# Patient Record
Sex: FEMALE | Race: WHITE
[De-identification: names, ages, dates, MRNs, and addresses within clinical notes are randomized per-mention and may not be internally consistent; named-entity substitution may affect disease eponyms.]

---

## 2022-12-12 ENCOUNTER — HOSPITAL ENCOUNTER (OUTPATIENT)
Dept: HOSPITAL 75 - CARD | Age: 82
End: 2022-12-12
Attending: INTERNAL MEDICINE
Payer: MEDICARE

## 2022-12-12 DIAGNOSIS — I34.81: Primary | ICD-10-CM

## 2022-12-12 PROCEDURE — 93306 TTE W/DOPPLER COMPLETE: CPT

## 2023-02-22 ENCOUNTER — HOSPITAL ENCOUNTER (INPATIENT)
Dept: HOSPITAL 75 - ER | Age: 83
LOS: 3 days | Discharge: NURSING, REHAB OR LONG-TERM CARE | DRG: 872 | End: 2023-02-25
Attending: INTERNAL MEDICINE | Admitting: INTERNAL MEDICINE
Payer: MEDICARE

## 2023-02-22 VITALS — BODY MASS INDEX: 35.62 KG/M2 | HEIGHT: 62.99 IN | WEIGHT: 201.06 LBS

## 2023-02-22 VITALS — DIASTOLIC BLOOD PRESSURE: 58 MMHG | SYSTOLIC BLOOD PRESSURE: 112 MMHG

## 2023-02-22 VITALS — DIASTOLIC BLOOD PRESSURE: 61 MMHG | SYSTOLIC BLOOD PRESSURE: 142 MMHG

## 2023-02-22 VITALS — SYSTOLIC BLOOD PRESSURE: 142 MMHG | DIASTOLIC BLOOD PRESSURE: 61 MMHG

## 2023-02-22 DIAGNOSIS — K21.9: ICD-10-CM

## 2023-02-22 DIAGNOSIS — N39.0: ICD-10-CM

## 2023-02-22 DIAGNOSIS — E78.00: ICD-10-CM

## 2023-02-22 DIAGNOSIS — Z20.822: ICD-10-CM

## 2023-02-22 DIAGNOSIS — I10: ICD-10-CM

## 2023-02-22 DIAGNOSIS — G47.33: ICD-10-CM
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
## 2023-02-22 DIAGNOSIS — A41.9: Primary | ICD-10-CM

## 2023-02-22 DIAGNOSIS — F32.A: ICD-10-CM

## 2023-02-22 DIAGNOSIS — E66.9: ICD-10-CM

## 2023-02-22 DIAGNOSIS — F03.90: ICD-10-CM

## 2023-02-22 LAB
ALBUMIN SERPL-MCNC: 3.3 GM/DL (ref 3.2–4.5)
ALP SERPL-CCNC: 46 U/L (ref 40–136)
ALT SERPL-CCNC: 11 U/L (ref 0–55)
APTT BLD: 27 SEC (ref 24–35)
APTT PPP: YELLOW S
ARTERIAL PATENCY WRIST A: (no result)
BACTERIA #/AREA URNS HPF: (no result) /HPF
BASE EXCESS STD BLDA CALC-SCNC: 1.9 MMOL/L (ref -2.5–2.5)
BASOPHILS # BLD AUTO: 0 10^3/UL (ref 0–0.1)
BASOPHILS NFR BLD AUTO: 0 % (ref 0–10)
BASOPHILS NFR BLD MANUAL: 0 %
BDY SITE: (no result)
BILIRUB SERPL-MCNC: 0.4 MG/DL (ref 0.1–1)
BILIRUB UR QL STRIP: NEGATIVE
BODY TEMPERATURE: 36.9
BUN/CREAT SERPL: 21
CALCIUM SERPL-MCNC: 8.4 MG/DL (ref 8.5–10.1)
CHLORIDE SERPL-SCNC: 106 MMOL/L (ref 98–107)
CO2 BLDA CALC-SCNC: 27.3 MMOL/L (ref 21–31)
CO2 SERPL-SCNC: 22 MMOL/L (ref 21–32)
CREAT SERPL-MCNC: 0.7 MG/DL (ref 0.6–1.3)
EOSINOPHIL # BLD AUTO: 0 10^3/UL (ref 0–0.3)
EOSINOPHIL NFR BLD AUTO: 0 % (ref 0–10)
EOSINOPHIL NFR BLD MANUAL: 0 %
FIBRINOGEN PPP-MCNC: CLEAR MG/DL
GFR SERPLBLD BASED ON 1.73 SQ M-ARVRAT: 87 ML/MIN
GLUCOSE SERPL-MCNC: 108 MG/DL (ref 70–105)
GLUCOSE UR STRIP-MCNC: NEGATIVE MG/DL
HCT VFR BLD CALC: 41 % (ref 35–52)
HGB BLD-MCNC: 13.8 G/DL (ref 11.5–16)
INHALED O2 FLOW RATE: (no result) L/MIN
INR PPP: 1.1 (ref 0.8–1.4)
KETONES UR QL STRIP: NEGATIVE
LEUKOCYTE ESTERASE UR QL STRIP: NEGATIVE
LYMPHOCYTES # BLD AUTO: 0.7 10^3/UL (ref 1–4)
LYMPHOCYTES NFR BLD AUTO: 5 % (ref 12–44)
MAGNESIUM SERPL-MCNC: 1.5 MG/DL (ref 1.6–2.4)
MANUAL DIFFERENTIAL PERFORMED BLD QL: YES
MCH RBC QN AUTO: 33 PG (ref 25–34)
MCHC RBC AUTO-ENTMCNC: 34 G/DL (ref 32–36)
MCV RBC AUTO: 97 FL (ref 80–99)
MONOCYTES # BLD AUTO: 1.4 10^3/UL (ref 0–1)
MONOCYTES NFR BLD AUTO: 9 % (ref 0–12)
MONOCYTES NFR BLD: 12 %
NEUTROPHILS # BLD AUTO: 12.5 10^3/UL (ref 1.8–7.8)
NEUTROPHILS NFR BLD AUTO: 85 % (ref 42–75)
NEUTS BAND NFR BLD MANUAL: 75 %
NEUTS BAND NFR BLD: 8 %
NITRITE UR QL STRIP: POSITIVE
PCO2 BLDA: 41 MMHG (ref 35–45)
PH BLDA: 7.42 [PH] (ref 7.37–7.43)
PH UR STRIP: 6.5 [PH] (ref 5–9)
PLATELET # BLD: 140 10^3/UL (ref 130–400)
PMV BLD AUTO: 9.8 FL (ref 9–12.2)
PO2 BLDA: 60 MMHG (ref 79–93)
POTASSIUM SERPL-SCNC: 3.4 MMOL/L (ref 3.6–5)
PROT SERPL-MCNC: 7 GM/DL (ref 6.4–8.2)
PROT UR QL STRIP: (no result)
PROTHROMBIN TIME: 14.7 SEC (ref 12.2–14.7)
RBC #/AREA URNS HPF: (no result) /HPF
RBC MORPH BLD: NORMAL
SAO2 % BLDA FROM PO2: 92 % (ref 94–100)
SODIUM SERPL-SCNC: 140 MMOL/L (ref 135–145)
SP GR UR STRIP: 1.02 (ref 1.02–1.02)
SQUAMOUS #/AREA URNS HPF: (no result) /HPF
T4 FREE SERPL-MCNC: 0.84 NG/DL (ref 0.7–1.48)
VALPROATE SERPL-MCNC: 77.1 UG/ML (ref 50–100)
VARIANT LYMPHS NFR BLD MANUAL: 5 %
VENTILATION MODE VENT: NO
WBC # BLD AUTO: 16.3 10^3/UL (ref 4.3–11)
WBC #/AREA URNS HPF: (no result) /HPF

## 2023-02-22 PROCEDURE — 51701 INSERT BLADDER CATHETER: CPT

## 2023-02-22 PROCEDURE — 74177 CT ABD & PELVIS W/CONTRAST: CPT

## 2023-02-22 PROCEDURE — 85007 BL SMEAR W/DIFF WBC COUNT: CPT

## 2023-02-22 PROCEDURE — 93041 RHYTHM ECG TRACING: CPT

## 2023-02-22 PROCEDURE — 84443 ASSAY THYROID STIM HORMONE: CPT

## 2023-02-22 PROCEDURE — 72125 CT NECK SPINE W/O DYE: CPT

## 2023-02-22 PROCEDURE — 80164 ASSAY DIPROPYLACETIC ACD TOT: CPT

## 2023-02-22 PROCEDURE — 87186 SC STD MICRODIL/AGAR DIL: CPT

## 2023-02-22 PROCEDURE — 86141 C-REACTIVE PROTEIN HS: CPT

## 2023-02-22 PROCEDURE — 85025 COMPLETE CBC W/AUTO DIFF WBC: CPT

## 2023-02-22 PROCEDURE — 36415 COLL VENOUS BLD VENIPUNCTURE: CPT

## 2023-02-22 PROCEDURE — 85730 THROMBOPLASTIN TIME PARTIAL: CPT

## 2023-02-22 PROCEDURE — 93005 ELECTROCARDIOGRAM TRACING: CPT

## 2023-02-22 PROCEDURE — 87077 CULTURE AEROBIC IDENTIFY: CPT

## 2023-02-22 PROCEDURE — 85610 PROTHROMBIN TIME: CPT

## 2023-02-22 PROCEDURE — 87040 BLOOD CULTURE FOR BACTERIA: CPT

## 2023-02-22 PROCEDURE — 87636 SARSCOV2 & INF A&B AMP PRB: CPT

## 2023-02-22 PROCEDURE — 83735 ASSAY OF MAGNESIUM: CPT

## 2023-02-22 PROCEDURE — 71260 CT THORAX DX C+: CPT

## 2023-02-22 PROCEDURE — 93306 TTE W/DOPPLER COMPLETE: CPT

## 2023-02-22 PROCEDURE — 83605 ASSAY OF LACTIC ACID: CPT

## 2023-02-22 PROCEDURE — 85027 COMPLETE CBC AUTOMATED: CPT

## 2023-02-22 PROCEDURE — 83880 ASSAY OF NATRIURETIC PEPTIDE: CPT

## 2023-02-22 PROCEDURE — 72170 X-RAY EXAM OF PELVIS: CPT

## 2023-02-22 PROCEDURE — 82805 BLOOD GASES W/O2 SATURATION: CPT

## 2023-02-22 PROCEDURE — 81000 URINALYSIS NONAUTO W/SCOPE: CPT

## 2023-02-22 PROCEDURE — 80053 COMPREHEN METABOLIC PANEL: CPT

## 2023-02-22 PROCEDURE — 84439 ASSAY OF FREE THYROXINE: CPT

## 2023-02-22 PROCEDURE — 71045 X-RAY EXAM CHEST 1 VIEW: CPT

## 2023-02-22 PROCEDURE — 70450 CT HEAD/BRAIN W/O DYE: CPT

## 2023-02-22 PROCEDURE — 87088 URINE BACTERIA CULTURE: CPT

## 2023-02-22 RX ADMIN — SODIUM CHLORIDE SCH MLS/HR: 900 INJECTION, SOLUTION INTRAVENOUS at 17:10

## 2023-02-22 RX ADMIN — ENOXAPARIN SODIUM SCH MG: 100 INJECTION SUBCUTANEOUS at 17:08

## 2023-02-22 RX ADMIN — DOCUSATE SODIUM SCH MG: 100 CAPSULE ORAL at 20:42

## 2023-02-22 RX ADMIN — SENNOSIDES SCH MG: 8.6 TABLET, FILM COATED ORAL at 20:42

## 2023-02-22 RX ADMIN — ACETAMINOPHEN PRN MG: 325 TABLET ORAL at 17:09

## 2023-02-22 NOTE — DIAGNOSTIC IMAGING REPORT
PROCEDURE: CT chest, abdomen, and pelvis with contrast.



TECHNIQUE: Multiple contiguous axial images were obtained through

the chest, abdomen, and pelvis after the administration of

intravenous contrast. Auto Exposure Controls were utilized during

the CT exam to meet ALARA standards for radiation dose reduction.





INDICATION:  Trauma. Fall. Chest pain. Possible pulmonary

contusion. 



COMPARISON: Chest and pelvis radiographs 02/22/2023.



FINDINGS: 



CT CHEST: Examination limited by respiratory motion. Scattered

atelectasis. Lungs are otherwise clear. No dense consolidation or

findings convincing of a pulmonary contusion. No pleural effusion

or pneumothorax. Cardiomegaly. Tiny pericardial effusion.

Advanced atherosclerotic calcifications is in the coronary

arteries. Isolated 1.2 cm short axis dimension pretracheal lymph

node. No acute osseous findings.



CT ABDOMEN AND PELVIS: Moderate esophageal hiatal hernia. Simple

cyst in the right hepatic lobe. Cholelithiasis without secondary

findings of cholecystitis. The gallbladder, pancreas, spleen,

adrenals, collecting systems and bladder negative. Calcified

uterine fibroids. No evidence of appendicitis. Simple appearing

cysts in the kidneys. No free intraperitoneal air or fluid. No

lymphadenopathy. No evidence of bowel obstruction. Advanced

diverticulosis without evidence of active diverticulitis. No

acute osseous findings. Postoperative changes in the anterior

abdominal wall consistent with hernia repair.



IMPRESSION: 

1. Scattered atelectasis in both lungs. No other focal pulmonary

consolidation or findings suspicious for pulmonary contusion.

2. Cardiomegaly. Tiny pericardial effusion.

3. Single mildly enlarged mediastinal lymph node measuring up 1.2

cm in short axis dimension may be reactive but is indeterminate.

4. Cholelithiasis without secondary findings cholecystitis.

5. Advanced colonic diverticulosis without evidence of active

diverticulitis.

6. Moderate esophageal hiatal hernia.



Dictated by: 



  Dictated on workstation # [id number]

## 2023-02-22 NOTE — XMS REPORT
Clinical Summary

                             Created on: 2023



Yolanda Pollock

External Reference #: [id number]

: 1940

Sex: Female



Demographics





                          Address                   [address]

[place]  [address]

 

                          Home Phone                [phone]

 

                          Preferred Language        Unknown

 

                          Marital Status            

 

                          Jehovah's witness Affiliation     Unknown

 

                          Race                      White

 

                          Ethnic Group              Not  or 





Author





                          Author                    [facility]

 

                          Organization              [facility]

 

                          Address                   Unknown

 

                          Phone                     Unavailable







Care Team Providers





                    Care Team Member Name Role                Phone

 

                          PCP                       Unavailable







Source Comments

[facility] (Labor and Delivery) documents do not appear in the [facility]



Allergies

Not on File



Medications

 

Please verify current medications with patient. 



Not on file



Active Problems





Not on file



Social History





                                        Date



                 Tobacco Use     Types           Packs/Day       Years Used 

 

                                         



                                         Smoking Tobacco: Never    



                                         Assessed    







 



                           Sex Assigned at Birth     Date Recorded

 

 



                                         Not on file 







Last Filed Vital Signs

Not on file



Plan of Treatment





   



                 Health Maintenance  Due Date        Last Done       Comments

 

   



                           DXA Scan                  1940  

 

   



                           COVID-19 Vaccine (#1)     1940  

 

   



                           Pneumococcal Vaccine: 65+  2006  



                                         Years (1 - PCV)   

 

   



                           Influenza Vaccine (#1)    10/01/2022  

 

   



                     HPV Vaccine         Aged Out            No longer eligible 

based on patient's age to



                                         complete this topic

 

   



                     Hepatitis A Vaccine  Aged Out            No longer eligible

 based on patient's age to



                                         complete this topic

 

   



                     Hepatitis B Vaccine  Aged Out            No longer eligible

 based on patient's age to



                                         complete this topic

 

   



                     Hib Vaccine         Aged Out            No longer eligible 

based on patient's age to



                                         complete this topic

 

   



                     IPV Vaccine         Aged Out            No longer eligible 

based on patient's age to



                                         complete this topic

 

   



                     Meningococcal Vaccine  Aged Out            No longer eligib

le based on patient's age to



                           (MCV4)                    complete this topic

 

   



                     Rotavirus Vaccine   Aged Out            No longer eligible 

based on patient's age to



                                         complete this topic







Results

Not on filefrom Last 3 Months

## 2023-02-22 NOTE — DIAGNOSTIC IMAGING REPORT
INDICATION: 

Fall, shortness of air.



COMPARISON: 

None available.  



FINDINGS:

The cardiac silhouette is enlarged. No significant pulmonary

vascular congestion. Low lung volumes. Calcifications within the

aorta. The right lung is clear of focal pulmonary opacities. Mild

left basilar opacities are present with associated blunting of

the left costophrenic angle. The left upper lung is clear. No

pneumothorax. No acute osseous abnormality.



IMPRESSION: 

Small left basilar pleural-parenchymal opacity, felt to relate to

a combination of trace pleural fluid with adjacent infiltrate

and/or atelectasis. Contusion could be considered.



Cardiomegaly without pulmonary vascular congestion.



Dictated by: 



  Dictated on workstation # [id number]

## 2023-02-22 NOTE — XMS REPORT
Clinical Summary

                             Created on: 2023



Yolanda Pollock

External Reference #: [id number]

: 1940

Sex: Female



Demographics





                          Address                   [address] 

[place]  [address]

 

                          Home Phone                [phone]

 

                          Preferred Language        English

 

                          Marital Status            

 

                          Latter-day Affiliation     [id number]

 

                          Race                      White

 

                          Ethnic Group              Not  or 





Author





                          Author                    [facility]

 

                          Address                   Unknown

 

                          Phone                     Unavailable







Support





                Name            Relationship    Address         Phone

 

                Jerome Pollock ECON            Unknown         [phone]

[phone]







Care Team Providers





                    Care Team Member Name Role                Phone

 

                          PCP                       Unavailable







Allergies

No known active allergies



Medications





                          End Date                  Status



              Medication   Sig          Dispensed    Refills      Start  



                                         Date  

 

                                                    Active



                     pseudoephedrine (SUDAFED)  Take 30 mg by       0   



                           30 MG tablet              mouth every 4     



                                         (four) hours     



                                         as needed.     

 

                                                    Active



                     losartan (COZAAR) 100 MG  Take 100 mg         0   



                           tablet                    by mouth     



                                         daily.     

 

                                                    Active



                     levothyroxine (SYNTHROID,  Take 100 mcg        0   



                           LEVOTHROID) 100 MCG       by mouth     



                           tablet                    daily.     

 

                                                    Active



              benztropine (COGENTIN)  one  tablet  0            0            /200  



                     0.5 MG tablet       in the am           9  



                                         [facility]     

 

                                                    Active



              haloperidol (HALDOL) 1 MG  One orally   0            0            

  



                     tablet              twice daily         9  



                                         [facility]     

 

                                                    Active



              Calcium Citrate-Vitamin D  Two orally   0            0            

  



                     (CITRACAL MAXIMUM)  twice daily         7  



                                         315-250 MG-UNIT TABS      

 

                                                    Active



              simvastatin (ZOCOR) 40 MG  One orally at  30           6          

    



                     tablet              bedtime             9  

 

                                                    Active



              losartan (COZAAR) 100 MG  One orally   30           6            0

200  



                     tablet              daily               9  

 

                                                    Active



              amitriptyline (ELAVIL) 25  One orally at  30           6          

  /200  



                     MG tablet           bedtime             9  

 

                                                    Active



              levothyroxine (SYNTHROID)  One po daily  30           6           

   



                           88 MCG tablet             9  

 

                                                    Active



              alum & mag   1 tbsp prn GI  0            0              



                     hydroxide-simethicone  upset               8  



                                         (MYLANTA) 200-200-20      



                                         MG/5ML SUSP      

 

                                                    Active



              Multiple Vitamin  Three orally  0            0            /20

1  



                     (MULTI-VITAMINS) TABS  daily               3  

 

                                                    Active



              polyethylene glycol  mix 17grams  527          1              



                     (MIRALAX) powder    in 8 oz water       9  



                                         every other     



                                         day     

 

                                                    Active



              aspirin 325 MG tablet  one daily    30           11           03/0

9/200  



                                         6  

 

                                                    Active



              lamoTRIgine (LAMICTAL) 25  One orally   0            0            

  



                     MG tablet           daily in the        9  



                                         am [facility]     

 

                                                    Active



              Ophthalmic Irrigation  daily per Dr  0            0            09/

10/200  



                     Solution (EYE WASH) SOLN  Carry               7  

 

                                                    Active



              .reconcile (MEDICATION  No Sig       1            0              



                           LIST IMPORTED)            3  







Active Problems





  



                     Problem             Noted Date          Diagnosed Date

 

  



                                         Edema  

 

  



                                         Unspecified essential hypertension  

 

  



                                         Esophageal reflux  

 

  



                                         Unspecified schizophrenia, unspecified 

condition  

 

  



                                         Pure hypercholesterolemia  

 

  



                                         Other dyspnea and respiratory abnormali

ty  







Immunizations





  



                     Name                Administration Dates  Next Due

 

  



                           Influenza IIV3 PFree      10/07/2008, 10/25/2006 

 

  



                           Pneumococcal              10/18/2006 



                                         Polysaccharide  



                                         (23-valent)  

 

  



                           Td(adult), adsorbed       2007 







Family History





  



                     Medical History     Relation            Comments

 

  



                     Other               Other 1             Mother - Cause of D

eath:Pulmonary Embolus; Other History:



                                         Obesity; hypertension

 

  



                     Other               Other 2             Father - Cause of D

eath:Heart disease

 

  



                     Other               Other 3             Sister #1 -  at

 the age of 45, Cause of Death:Cancer,



                                         Colon

 

  



                     Other               Other 4             Sister #2 -  at

 the age of 43, Cause of Death:CVA (?



                                         hemorrhagic)

 

  



                     Other               Other 5             Family History Comm

ents - 3rd sister obese and with



                                         hypertension, 4th sister alive and well

, 2 brothers, No other



                                         history found







  



                     Relation            Status              Comments

 

  



                           Father                     



                                         (Age 87) 

 

  



                           Mother                     



                                         (Age 75) 

 

  



                                         Other 1  

 

  



                                         Other 2  

 

  



                                         Other 3  

 

  



                                         Other 4  

 

  



                                         Other 5  







Social History





                                        Date



                 Tobacco Use     Types           Packs/Day       Years Used 

 

                                         



                                         Smoking Tobacco: Never    







                                        Date Recorded



                           Sex and Gender Information  Value 

 

                                         



                           Sex Assigned at Birth     Not on file 

 

                                         



                           Gender Identity           Not on file 

 

                                         



                           Sexual Orientation        Not on file 







Last Filed Vital Signs





                    Reading             Time Taken          Comments



                                         Vital Sign   

 

                    144/70              2012  8:46 AM CDT  



                                         Blood Pressure   

 

                    73                  2012  8:46 AM CDT  



                                         Pulse   

 

                    37.6 C (99.6 F) 2012  6:00 AM CDT  



                                         Temperature   

 

                    16                  2012  8:46 AM CDT  



                                         Respiratory Rate   

 

                    97%                 2012  8:46 AM CDT  



                                         Oxygen Saturation   

 

                    -                   -                    



                                         Inhaled Oxygen   



                                         Concentration   

 

                    113.4 kg (250 lb)   07/10/2012  8:00 PM CDT  



                                         Weight   

 

                    172.7 cm (5' 8")    07/10/2012  8:00 PM CDT  



                                         Height   

 

                    38.01               07/10/2012  8:00 PM CDT  



                                         Body Mass Index   







Plan of Treatment





   



                 Health Maintenance  Due Date        Last Done       Comments

 

   



                           COVID-19 Vaccine (#1)     1940  

 

   



                           Zoster Vaccine (1 of 2)   1991  

 

   



                     DTaP,Tdap,and Td Vaccines  2007 



                                         (1 - Tdap)   

 

   



                     DEXA Scan           2012 

 

   



                     Influenza Vaccine (#1)  2022          10/07/2008, 



                                         10/25/2006 

 

   



                     Pneumo-Vaccine: 65+Yrs  Completed           10/18/2006 

 

   



                     HIB Vaccines        Aged Out            No longer eligible 

based on patient's age to



                                         complete this topic

 

   



                     IPV Vaccines        Aged Out            No longer eligible 

based on patient's age to



                                         complete this topic

 

   



                     Meningococcal Vaccine  Aged Out            No longer eligib

le based on patient's age to



                                         complete this topic

 

   



                     Rotavirus Vaccines  Aged Out            No longer eligible 

based on patient's age to



                                         complete this topic







Results

Not on filefrom Last 3 Months

## 2023-02-22 NOTE — TELE-ICU CONSULT
History of Present Illness


History of Present Illness


Date Seen by Provider:  Feb 22, 2023


Time Seen by Provider:  18:11


Date of Admission








(Tele-ICU Physician ,   consultation as per request of PCP 


Service provided via interactive audio and video telecommunications  E-CARE 

system to a patient admitted to ICU bed in [facility].








Available chart/ vitals / labs / Images reviewed


H&P is from ER notes


Patient's information available about PMH, Shx, Fhx   allergy reviewed inEMR.


ROS as per chart and RN report





Now in ICU, hemodynamically stable


Video assessment done using   teleICU camera, rest of exam as per RN


Discussed with RN.





Consultants:


Hospital course:


2/22 83 y/o female from [facility] s/p fall  ,  severe sepsis UTI








A/P





Sepsis, severe


- IVF givem  BNP pending , lactate improved , vitals stable 





UTI


-  Rocephin





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 ? mild mass effect on 

the cerebellum





CHF, possible 


- Echo on 12/12/22 , EF 70-75%,


- cards consulted 





unwitnessed fall 


- CT  cervical spine- no acute finding 


- CTH - 





CHASTITY, reported


- ? CPAP 





dementia and bipolar


- baseline reported AOx2





Lines :      , (Central Line Necessity Reviewed)


Sotelo:


OG:


Nutrition:


Analgesia:


Anxiety/ delirium








VTE Prophylaxis: lov 40 


Stress Ulcer Prophylaxis: na





Glycemic Control:








Plans in collaboration with   bedside consultants and IM MDs.


Discussed with RN to reach out if any questions or concerns


A total of 31 minutes of critical care time was devoted to this patient today, 

required to treat and/or prevent further deterioration of  critical care 

condition ( as above ) .





I am remotely monitoring this patient from another state.  I am unable to do the

bedside exam, and history/physical and pertinent information is taken from other

notes in the computer and bedside staff. .








Reason for Visit:  Questionable CHF





Allergies and Home Medications


Allergies


Coded Allergies:  


     codeine (Verified  Allergy, Unknown, 2/22/23)


     lurasidone (Verified  Allergy, Unknown, 2/22/23)


     quetiapine (Verified  Allergy, Unknown, 2/22/23)


     trifluoperazine (Verified  Allergy, Unknown, 2/22/23)





Past Medical/Social/Family Hx


Patient Social History


Tobacco Use?:  No


Smoking Status:  Unknown if Ever Smoked


Substance use?:  Unable to obtain


Alcohol Use?:  Unable to obtain


Pt stated abuse/neglect:  No





Immunizations Up To Date


Influenza Vaccine Up-to-Date:  Yes; Up-to-Date


Tetanus Booster (TDap):  Unknown





Current Status


Advance Directives:  No


Primary Language:  English


Preferred Spoken Language:  English





Review of Systems


Constitutional:  see HPI





Focused Exam


Lactate Level


2/22/23 12:16: Lactic Acid Level 3.01*H


2/22/23 14:42: Lactic Acid Level 1.87


Height, Weight, BMI


Height: '"


Weight: lbs. oz. kg; 36.13 BMI


Method:


Time of Focused Exam:  13:30


Lactic Acid Level





Laboratory Tests








Test


 2/22/23


14:42


 


Lactic Acid Level


 1.87 MMOL/L


(0.50-2.00)











Exam


Exam


Patient acknowledged, consented, and participated in this virtual visit which 

was conducted using real time audio/video


Vital Signs








  Date Time  Temp Pulse Resp B/P (MAP) Pulse Ox O2 Delivery O2 Flow Rate FiO2


 


2/22/23 17:09 38.2       


 


2/22/23 16:53 36.6       


 


2/22/23 16:44 36.9 99   93   


 


2/22/23 16:22  99      


 


2/22/23 16:17  94 32 142/61 (88) 93 Room Air  


 


2/22/23 15:57  96 30 122/54 95 Room Air  


 


2/22/23 07:45 36.9 90 14 128/87 (101)  Room Air  








Height & Weight


Height: '"


Weight: lbs. oz. kg; 36.13 BMI


Method:


General Appearance:  No Apparent Distress, Mild Distress


Respiratory:  Lungs Clear, Normal Breath Sounds, No Accessory Muscle Use


Cardiovascular:  No Edema, No Murmur, Tachycardia


Capillary Refill:  Less Than 3 Seconds


Neurologic/Psychiatric:  Disoriented


Skin:  Normal Color





Results


Lab


Laboratory Tests


2/22/23 08:47











Assessment/Plan


Assessment/Plan


1











MELODIE LARSEN MD         Feb 22, 2023 18:12

## 2023-02-22 NOTE — ED FALL/INJURY
General


Chief Complaint:  Trauma-Non Activation


Stated Complaint:  FALL |


Nursing Triage Note:  


PT TO ROOM 5 BY WC WITH COMPLAINT OF FALL. PER [facility] HOME NURSE, PT FELL SOMETIME 


DURING THE NIGHT ACCORDING TO NIGHT STAFF. PT HAS HX OF DEMENTIA AND IS A&O TO 


PERSON AND PLACE. PT IS COMPLAINING OF HEAD PAIN.


Source:  patient, EMS, nursing home records, old records, caregiver


Exam Limitations:  clinical condition





History of Present Illness


Date Seen by Provider:  2023


Time Seen by Provider:  08:20


Initial Comments


This 82-year-old woman is a resident of [facility] who presents to

the emergency room via EMS after having a fall.  The fall was unwitnessed.  Her 

neighbor reportedly heard the fall and reported to staff.  Patient presents 

complaining of headache but not reporting any other symptoms.  She is asking 

when she can eat breakfast.





I later interviewed the nurse from the nursing home.  It seems that patient had 

an unwitnessed fall that was brought to the attention of staff by her roommate. 

She was assisted up from the floor by staff and was able to bear weight.  The 

fall reportedly happened around 0 630.  Patient is generally alert to person and

place.  Nursing home staff reports she has been slower to respond than normal.  

She usually can ambulate on her own to the restroom and is able to manage toil

eting herself.  She does have diagnoses of dementia and bipolar.





Allergies and Home Medications


Allergies


Coded Allergies:  


     codeine (Verified  Allergy, Unknown, 23)


     lurasidone (Verified  Allergy, Unknown, 23)


     quetiapine (Verified  Allergy, Unknown, 23)


     trifluoperazine (Verified  Allergy, Unknown, 23)





Patient Home Medication List


Home Medication List Reviewed:  Yes





Review of Systems


Review of Systems


Constitutional:  see HPI, weakness


Eyes:  No Symptoms Reported


Ears, Nose, Mouth, Throat:  no symptoms reported


Respiratory:  no symptoms reported


Cardiovascular:  no symptoms reported


Gastrointestinal:  no symptoms reported


Genitourinary:  no symptoms reported


Pregnant:  No


Musculoskeletal:  no symptoms reported


Skin:  no symptoms reported


Psychiatric/Neurological:  See HPI





Past Medical-Social-Family Hx


Patient Social History


Tobacco Use?:  No


Smoking Status:  Unknown if Ever Smoked


Substance use?:  Unable to obtain


Alcohol Use?:  Unable to obtain


Pt feels they are or have been:  No





Past Medical History


Surgeries:  No (Unknown)


Respiratory:  Yes


Sleep Apnea


Cardiac:  Yes (history of heart failure)


High Cholesterol, Hypertension


Neurological:  Yes


Dementia


Genitourinary:  Yes (History of UTI)


Gastrointestinal:  Yes


Gastroesophageal Reflux


Musculoskeletal:  No


Endocrine:  Yes


Hypothyroidsim


HEENT:  No


Cancer:  No


Psychosocial:  Yes (Schizoaffective disorder)


Bipolar


Blood Disorders:  Yes (Anemia)





Physical Exam


Vital Signs





Vital Signs - First Documented








 23





 07:45


 


Temp 36.9


 


Pulse 90


 


Resp 14


 


B/P (MAP) 128/87 (101)


 


O2 Delivery Room Air





Capillary Refill : Less Than 3 Seconds


Height, Weight, BMI


Height: '"


Weight: lbs. oz. kg;  BMI


Method:


General Appearance:  WD/WN, no apparent distress


HEENT:  PERRL/EOMI, normal ENT inspection, other (Occasionally reaches for head 

as if it aches)


Neck:  non-tender, normal inspection


Cardiovascular:  regular rate, rhythm, no edema, no murmur


Respiratory:  lungs clear, normal breath sounds, no respiratory distress


Gastrointestinal:  normal bowel sounds, non tender, soft


Extremities:  non-tender, normal inspection, no pedal edema, other (No 

tenderness over the hips.  No pain with rotation of the hips)


Neurologic/Psychiatric:  alert, disoriented x 3, other (Does not follow 

instructions well.  Answers some questions appropriately but talks gibberish at 

other times.  No motor focal deficits were observed.)


Skin:  normal color, warm/dry





[place] Coma Score


Best Eye Response:  (4) Open Spontaneously


Best Verbal Response:  (4) Confused Conversation


Best Motor Response:  (6) Obeys Commands


[place] Total:  15





Progress/Results/Core Measures


Results/Orders


Lab Results





Laboratory Tests








Test


 23


08:47 23


10:00 23


12:16 Range/Units


 


 


White Blood Count


 16.3 H


 


 


 4.3-11.0


10^3/uL


 


Red Blood Count


 4.21 


 


 


 3.80-5.11


10^6/uL


 


Hemoglobin 13.8    11.5-16.0  g/dL


 


Hematocrit 41    35-52  %


 


Mean Corpuscular Volume 97    80-99  fL


 


Mean Corpuscular Hemoglobin 33    25-34  pg


 


Mean Corpuscular Hemoglobin


Concent 34 


 


 


 32-36  g/dL





 


Red Cell Distribution Width 12.9    10.0-14.5  %


 


Platelet Count


 140 


 


 


 130-400


10^3/uL


 


Mean Platelet Volume 9.8    9.0-12.2  fL


 


Immature Granulocyte % (Auto) 1     %


 


Neutrophils (%) (Auto) 85 H   42-75  %


 


Lymphocytes (%) (Auto) 5 L   12-44  %


 


Monocytes (%) (Auto) 9    0-12  %


 


Eosinophils (%) (Auto) 0    0-10  %


 


Basophils (%) (Auto) 0    0-10  %


 


Neutrophils # (Auto)


 12.5 H


 


 


 1.8-7.8


10^3/uL


 


Lymphocytes # (Auto)


 0.7 L


 


 


 1.0-4.0


10^3/uL


 


Monocytes # (Auto)


 1.4 H


 


 


 0.0-1.0


10^3/uL


 


Eosinophils # (Auto)


 0.0 


 


 


 0.0-0.3


10^3/uL


 


Basophils # (Auto)


 0.0 


 


 


 0.0-0.1


10^3/uL


 


Immature Granulocyte # (Auto)


 0.1 


 


 


 0.0-0.1


10^3/uL


 


Neutrophils % (Manual) 75     %


 


Lymphocytes % (Manual) 5     %


 


Monocytes % (Manual) 12     %


 


Eosinophils % (Manual) 0     %


 


Basophils % (Manual) 0     %


 


Band Neutrophils 8     %


 


Blood Morphology Comment NORMAL     


 


Prothrombin Time 14.7    12.2-14.7  SEC


 


INR Comment 1.1    0.8-1.4  


 


Activated Partial


Thromboplast Time 27 


 


 


 24-35  SEC





 


Sodium Level 140    135-145  MMOL/L


 


Potassium Level 3.4 L   3.6-5.0  MMOL/L


 


Chloride Level 106      MMOL/L


 


Carbon Dioxide Level 22    21-32  MMOL/L


 


Anion Gap 12    5-14  MMOL/L


 


Blood Urea Nitrogen 15    7-18  MG/DL


 


Creatinine


 0.70 


 


 


 0.60-1.30


MG/DL


 


Estimat Glomerular Filtration


Rate 87 


 


 


  





 


BUN/Creatinine Ratio 21     


 


Glucose Level 108 H     MG/DL


 


Calcium Level 8.4 L   8.5-10.1  MG/DL


 


Corrected Calcium 9.0    8.5-10.1  MG/DL


 


Magnesium Level 1.5 L   1.6-2.4  MG/DL


 


Total Bilirubin 0.4    0.1-1.0  MG/DL


 


Aspartate Amino Transf


(AST/SGOT) 24 


 


 


 5-34  U/L





 


Alanine Aminotransferase


(ALT/SGPT) 11 


 


 


 0-55  U/L





 


Alkaline Phosphatase 46      U/L


 


C-Reactive Protein High


Sensitivity 1.39 H


 


 


 0.00-0.50


MG/DL


 


Total Protein 7.0    6.4-8.2  GM/DL


 


Albumin 3.3    3.2-4.5  GM/DL


 


Thyroid Stimulating Hormone


(TSH) 2.55 


 


 


 0.35-4.94


UIU/ML


 


Free Thyroxine


 0.84 


 


 


 0.70-1.48


NG/DL


 


Valproic Acid (Depakene) Level


 77.1 


 


 


 50.0-100.0


UG/ML


 


Influenza Type A (RT-PCR) Not Detected    Not Detecte  


 


Influenza Type B (RT-PCR) Not Detected    Not Detecte  


 


SARS-CoV-2 RNA (RT-PCR) Not Detected    Not Detecte  


 


Urine Color  YELLOW    


 


Urine Clarity  CLEAR    


 


Urine pH  6.5   5-9  


 


Urine Specific Gravity  1.020   1.016-1.022  


 


Urine Protein  1+ H  NEGATIVE  


 


Urine Glucose (UA)  NEGATIVE   NEGATIVE  


 


Urine Ketones  NEGATIVE   NEGATIVE  


 


Urine Nitrite  POSITIVE H  NEGATIVE  


 


Urine Bilirubin  NEGATIVE   NEGATIVE  


 


Urine Urobilinogen  0.2   < = 1.0  MG/DL


 


Urine Leukocyte Esterase  NEGATIVE   NEGATIVE  


 


Urine RBC (Auto)  TRACE-I H  NEGATIVE  


 


Urine RBC  RARE    /HPF


 


Urine WBC  5-10 H   /HPF


 


Urine Squamous Epithelial


Cells 


 RARE 


 


  /HPF





 


Urine Crystals  NONE    /LPF


 


Urine Bacteria  LARGE H   /HPF


 


Urine Casts  NONE    /LPF


 


Urine Mucus  NEGATIVE    /LPF


 


Urine Culture Indicated  YES    


 


Lactic Acid Level


 


 


 3.01 *H


 0.50-2.00


MMOL/L








My Orders





Orders - ASHOK DEL CID MD


Cbc With Automated Diff (23 08:29)


Comprehensive Metabolic Panel (23 08:29)


Hs C Reactive Protein (23 08:29)


Magnesium (23 08:29)


Thyroid Stimulating Hormone (23 08:29)


Ua Culture If Indicated (23 08:29)


Valproic Acid (23 08:29)


Free T4 (Free Thyroxine) (23 08:29)


Covid 19 Inhouse Test (23 08:29)


Influenza A And B By Pcr (23 08:29)


Ct Head/Cervical Spine Wo (23 08:29)


Chest 1 View, Ap/Pa Only (23 08:29)


Pelvis 1 To 2 Views (23 08:29)


Ekg Tracing (23 08:45)


Monitor-Rhythm Ecg Trace Only (23 08:45)


Manual Differential (23 08:47)


Urine Culture (23 10:00)


Blood Culture (23 11:32)


Sputum Culture (23 11:32)


Protime With Inr (23 11:32)


Partial Thromboplastin Time (23 11:32)


Vital Signs Adult Sepsis Patie Q15M (23 11:32)


Remove Rings In Anticipation O (23 11:32)


Lactic Acid Analyzer (23 11:32)


Lactated Ringers (Lr 1000 Ml Iv Solution (23 11:45)


Ct Chest/Abdomen/Pelvis W (23 11:32)


Ceftriaxone 1 Gm Pre-Mix (Rocephin 1 Gm (23 11:32)


Iohexol Injection (Omnipaque 350 Mg/Ml 1 (23 11:45)


Received Contrast (Hold Metformin- Contr (23 11:45)


Ns (Ivpb) (Sodium Chloride 0.9% Ivpb Bag (23 11:45)


Magnesium 1 Gm/100 Ml Ivpb (Magnesium Cowan (23 14:00)


Arterial Blood Gas (23 14:09)


Bnp Atkinson (23 14:09)





Medications Given in ED





Current Medications








 Medications  Dose


 Ordered  Sig/Yasmin


 Route  Start Time


 Stop Time Status Last Admin


Dose Admin


 


 Iohexol  100 ml  ONCE  ONCE


 IV  23 11:45


 23 11:46 DC 23 12:33


80 ML


 


 Lactated Ringer's  1,000 ml @ 


 0 mls/hr  Q0M ONCE


 IV  23 11:45


 23 11:46 DC 23 12:54


0 MLS/HR


 


 Sodium Chloride  100 ml  ONCE  ONCE


 IV  23 11:45


 23 11:46 DC 23 12:33


80 ML








Vital Signs/I&O











 23





 07:45


 


Temp 36.9


 


Pulse 90


 


Resp 14


 


B/P (MAP) 128/87 (101)


 


O2 Delivery Room Air














Blood Pressure Mean:                    101











Progress


Progress Note #1:  


   Time:  13:42


   Progress Note


Patient was interviewed and examined shortly after arrival.  I contacted the 

nurse at the nursing home.  Patient seems to be below her baseline and cognition

and functioning.  CT of the head and cervical spine was pursued along with x-ray

of the chest and pelvis.  C-collar would not fit her body habitus appropriately,

so manual C-spine precautions were observed.  Patient denied pain or tenderness 

in the neck.  Basic labs and urinalysis were also obtained.  Patient was found 

to have significant urinary tract infection along with leukocytosis.  Septic 

work-up was then pursued as well.  She is noted to have elevated lactic acid.  

She is presumed septic from UTI.  No significant injuries were identified on 

initial imaging studies.  There was question of pulmonary infiltrate versus 

contusion on chest x-ray.  This prompted CT evaluation of chest, abdomen and 

pelvis.  No additional injuries were identified.  There was small pleural 

effusion.  Hypomagnesemia was noted and magnesium will be replaced.  On repeat 

evaluation of the patient she is found to have clear lungs.  She is alert but 

not conversant.  She has had some mild hypoxia and is now requiring nasal 

cannula.  Rocephin was administered for initial antibiotic therapy after blood 

cultures were drawn.


Progress Note #2:  


   Time:  14:16


   Progress Note


Patient has received Rocephin.  Magnesium replacement was ordered.  She is 

receiving a liter of LR.  Because of mild hypoxia and small pleural effusions, 

no further IV fluids will be administered until BNP is evaluated.  Vital signs 

are otherwise stable.  I discussed the case with Dr. Lindsey, her PCP and 

admitting physician.  Dr. Lindsey is agreeable to admission to the cardiac 

stepdown unit.  Patient has been cleared from a trauma perspective via thorough 

imaging.  She is not being admitted for any trauma reasons and therefore does 

not need any surgical consultation.  She is being admitted for medical reasons. 

Because of patient's history of sleep apnea and mild hypoxia in the ER, Dr. Lindsey has requested an arterial blood gas.  We will obtain that prior to 

admission.





Diagnostic Imaging





   Diagonstic Imaging:  Xray


   Plain Films/CT/US/NM/MRI:  chest


   Comments


NAME:   CARIE OJEDA


MED REC#:   [id number]


ACCOUNT#:   [id number]


PT STATUS:   REG ER


:   1940


PHYSICIAN:   ASHOK DEL CID MD


ADMIT DATE:   23/ER


                                  ***Signed***


Date of Exam:23





CHEST 1 VIEW, AP/PA ONLY








INDICATION: 


Fall, shortness of air.





COMPARISON: 


None available.  





FINDINGS:


The cardiac silhouette is enlarged. No significant pulmonary


vascular congestion. Low lung volumes. Calcifications within the


aorta. The right lung is clear of focal pulmonary opacities. Mild


left basilar opacities are present with associated blunting of


the left costophrenic angle. The left upper lung is clear. No


pneumothorax. No acute osseous abnormality.





IMPRESSION: 


Small left basilar pleural-parenchymal opacity, felt to relate to


a combination of trace pleural fluid with adjacent infiltrate


and/or atelectasis. Contusion could be considered.





Cardiomegaly without pulmonary vascular congestion.





Dictated by: 





  Dictated on workstation # [id number]








Dict:   [id number]


Trans:   23 1303


 [id number]





Interpreted by:     ELISABETH ROBERTS MD


Electronically signed by: ELISABETH ROBERTS MD 23 1303








   Diagonstic Imaging:  CT


   Plain Films/CT/US/NM/MRI:  c-spine, head


   Comments


NAME:   CARIE OJEDA


MED REC#:   [id number]


ACCOUNT#:   [id number]


PT STATUS:   REG ER


:   1940


PHYSICIAN:   ASHOK DEL CID MD


ADMIT DATE:   23/ER


                                  ***Signed***


Date of Exam:23





CT HEAD/CERVICAL SPINE WO





PROCEDURE: 


CT head and CT cervical spine without contrast.





TECHNIQUE: 


Multiple contiguous axial images were obtained through the brain


and cervical spine without the use of intravenous contrast.


Sagittal and coronal reformations through the cervical spine were


then performed. Auto Exposure Controls were utilized during the


CT exam to meet ALARA standards for radiation dose reduction. 





INDICATION:  


Trauma. Fall. Head and neck injury. Head pain. History of


dementia.





COMPARISON: 


None.





FINDINGS:


The ventricles and cortical sulci are diffusely prominent,


consistent with generalized parenchymal volume loss. There is no


significant midline shift or cerebral mass effect. No acute


intracranial hemorrhage is seen. There is no CT evidence of acute


territorial ischemia. There does appear to be prominent CSF space


in the inferior aspect of the posterior fossa, left greater than


right, measuring about 1.2 cm in thickness. This appears to cause


mass effect on the cerebellum. The calvarium appears intact. The


visualized paranasal sinuses appear clear. There is mild


hyperostosis frontalis.





There is grade 1 anterolisthesis at C3-C4. There are moderate


degenerative changes at C3-C4, C4-C5, and C5-C6 with mild


degenerative changes elsewhere in the cervical spine. There is


multilevel facet arthropathy, most pronounced in the upper left


cervical spine. No acute fracture is seen in the cervical spine.


No bony fragments or hyperdense fluid collections are identified.


Surrounding soft tissues demonstrate no acute abnormality.





IMPRESSION:


1. No acute intracranial hemorrhage or calvarium fracture.





2. Prominent CSF density in the posterior fossa may represent an


arachnoid cyst or a hygroma. There does appear to be mild mass


effect on the cerebellum.





3. Degenerative changes in the cervical spine with no acute


fracture seen.





Dictated by: 





  Dictated on workstation # [id number]





Dict:   23


Trans:   23 1348


 [id number]





Interpreted by:     DEAN MORA MD


Electronically signed by: DEAN MORA MD 23 1348








   Diagonstic Imaging:  Xray


   Plain Films/CT/US/NM/MRI:  pelvis


   Comments


NAME:   CARIE OJEDA


MED REC#:   [id number]


ACCOUNT#:   [id number]


PT STATUS:   REG ER


:   1940


PHYSICIAN:   ASHOK DEL CID MD


ADMIT DATE:   23/ER


                                  ***Signed***


Date of Exam:23





PELVIS 1 TO 2 VIEWS








INDICATION: Pelvic pain





COMPARISON: None available. 





TECHNIQUE: Single radiograph of the pelvis dated 2023.





FINDINGS: Multiple mesh tacks are identified overlying the


abdomen and pelvis, including overlying the bilateral inguinal


regions and more central abdomen and pelvis. No abnormally


dilated loops of bowel within the field-of-view. A 2.2 cm


calcification is noted overlying the right upper abdomen, though


is partially included within the field-of-view. No acute fracture


or dislocation. No destructive osseous process. Moderate


degenerative changes noted within the lower lumbar spine. Mild


degenerative changes in the bilateral hips. Rounded


calcifications within the right pelvis, felt to relate to


calcified uterine fibroid.





IMPRESSION: No acute osseous abnormality with scattered


degenerative changes and postsurgical changes as above.





Partially visualized 2.2 cm calcification overlying the right


abdomen. This of uncertain etiology. This could simply be


external to the patient. Alternatively, this could relate to a


renal stone or gallstone. Dedicated radiographs of abdomen would


help to better visualize and characterize this calcification.





Probable calcified uterine fibroid within the right pelvis.





Dictated by: 





  Dictated on workstation # [id number]








Dict:   23 0910


Trans:   23 1303


[facility] [id number]





Interpreted by:     ELISABETH ROBERTS MD


Electronically signed by: ELISABETH ROBERTS MD 23 1308








   Diagonstic Imaging:  CT


   Plain Films/CT/US/NM/MRI:  abdomen, pelvis


   Comments


NAME:   CARIE OJEDA


MED REC#:   [id number]


ACCOUNT#:   [id number]


PT STATUS:   ADM IN


:   1940


PHYSICIAN:   ASHOK DEL CID MD


ADMIT DATE:   23/ICU


                                  ***Signed***


Date of Exam:23





CT CHEST/ABDOMEN/PELVIS W








PROCEDURE: CT chest, abdomen, and pelvis with contrast.





TECHNIQUE: Multiple contiguous axial images were obtained through


the chest, abdomen, and pelvis after the administration of


intravenous contrast. Auto Exposure Controls were utilized during


the CT exam to meet ALARA standards for radiation dose reduction.








INDICATION:  Trauma. Fall. Chest pain. Possible pulmonary


contusion. 





COMPARISON: Chest and pelvis radiographs 2023.





FINDINGS: 





CT CHEST: Examination limited by respiratory motion. Scattered


atelectasis. Lungs are otherwise clear. No dense consolidation or


findings convincing of a pulmonary contusion. No pleural effusion


or pneumothorax. Cardiomegaly. Tiny pericardial effusion.


Advanced atherosclerotic calcifications is in the coronary


arteries. Isolated 1.2 cm short axis dimension pretracheal lymph


node. No acute osseous findings.





CT ABDOMEN AND PELVIS: Moderate esophageal hiatal hernia. Simple


cyst in the right hepatic lobe. Cholelithiasis without secondary


findings of cholecystitis. The gallbladder, pancreas, spleen,


adrenals, collecting systems and bladder negative. Calcified


uterine fibroids. No evidence of appendicitis. Simple appearing


cysts in the kidneys. No free intraperitoneal air or fluid. No


lymphadenopathy. No evidence of bowel obstruction. Advanced


diverticulosis without evidence of active diverticulitis. No


acute osseous findings. Postoperative changes in the anterior


abdominal wall consistent with hernia repair.





IMPRESSION: 


1. Scattered atelectasis in both lungs. No other focal pulmonary


consolidation or findings suspicious for pulmonary contusion.


2. Cardiomegaly. Tiny pericardial effusion.


3. Single mildly enlarged mediastinal lymph node measuring up 1.2


cm in short axis dimension may be reactive but is indeterminate.


4. Cholelithiasis without secondary findings cholecystitis.


5. Advanced colonic diverticulosis without evidence of active


diverticulitis.


6. Moderate esophageal hiatal hernia.





Dictated by: 





  Dictated on workstation # [id number]








Dict:   23 1257


Trans:   23 1718


[facility] [id number]





Interpreted by:     JOSE SIERRA MD


Electronically signed by: JOSE SIERRA MD 23 1718





Focused Exam


Sepsis Stage:  Severe Sepsis


Possible Source:  Genitouriary


Lactate Level


23 12:16: Lactic Acid Level 3.01*H





Time of Focused Exam:  13:30


Respiratory:  Lungs Clear, Normal Breath Sounds, No Accessory Muscle Use


Cardiovascular:  No Edema, No Murmur, Irregularly Irregular


Capillary Refill:  Less Than 3 Seconds


Skin:  normal color, warm/dry


Lactic Acid Level





Laboratory Tests








Test


 23


12:16


 


Lactic Acid Level


 3.01 MMOL/L


(0.50-2.00)  *H








Within 3hrs of presentation:  Admin fluids, Admin ABX, Blood cultures prior to 

ABX's, Focus exam, Lactate level





Departure


Communication (Admissions)


Time/Spoke to Admitting Phy:  14:10


Dr. Lindsey





Impression





   Primary Impression:  


   Severe sepsis


   Additional Impressions:  


   UTI (urinary tract infection)


   Qualified Codes:  N39.0 - Urinary tract infection, site not specified


   Altered mental status


   Qualified Codes:  R41.82 - Altered mental status, unspecified


   Hypomagnesemia


   Fall on same level


   Qualified Codes:  W18.30XA - Fall on same level, unspecified, initial 

   encounter


Disposition:   ADMITTED AS INPATIENT


Condition:  Stable





Admissions


Decision to Admit Reason:  Admit from ER (General)


Decision to Admit/Date:  2023


Time/Decision to Admit Time:  14:10





Departure-Patient Inst.


Referrals:  


BRITTANY LINDSEY DO (PCP)


Primary Care Physician











ASHOK DEL CID MD        2023 08:32

## 2023-02-22 NOTE — DIAGNOSTIC IMAGING REPORT
INDICATION: Pelvic pain



COMPARISON: None available. 



TECHNIQUE: Single radiograph of the pelvis dated 02/22/2023.



FINDINGS: Multiple mesh tacks are identified overlying the

abdomen and pelvis, including overlying the bilateral inguinal

regions and more central abdomen and pelvis. No abnormally

dilated loops of bowel within the field-of-view. A 2.2 cm

calcification is noted overlying the right upper abdomen, though

is partially included within the field-of-view. No acute fracture

or dislocation. No destructive osseous process. Moderate

degenerative changes noted within the lower lumbar spine. Mild

degenerative changes in the bilateral hips. Rounded

calcifications within the right pelvis, felt to relate to

calcified uterine fibroid.



IMPRESSION: No acute osseous abnormality with scattered

degenerative changes and postsurgical changes as above.



Partially visualized 2.2 cm calcification overlying the right

abdomen. This of uncertain etiology. This could simply be

external to the patient. Alternatively, this could relate to a

renal stone or gallstone. Dedicated radiographs of abdomen would

help to better visualize and characterize this calcification.



Probable calcified uterine fibroid within the right pelvis.



Dictated by: 



  Dictated on workstation # [id number]

## 2023-02-22 NOTE — CONSULTATION-CARDIOLOGY
HPI-Cardiology


Cardiology Consultation


Date of Consultation


2/22/23


Date of Admission





Time Seen by Provider:  15:26


Indication:  Questionable CHF





HPI


Patient is an 81 y/o female with history of HTN, RBBB, HLP. Presented to the ER 

from NH after having unwitnessed fall. Per nurse report, resident next door to 

her heard her fall. Staff reports increasing lethargy and malaise over the past 

week. Patient has AMS and underlying dementia and is unable to answer questions.

HPI obtained from reviewing medical records.





Home Medications & Allergies


Allergies:  


Coded Allergies:  


     codeine (Verified  Allergy, Unknown, 2/22/23)


     lurasidone (Verified  Allergy, Unknown, 2/22/23)


     quetiapine (Verified  Allergy, Unknown, 2/22/23)


     trifluoperazine (Verified  Allergy, Unknown, 2/22/23)


Home Medication List Reviewed:  Yes





PMH-Social-Family Hx


Patient Social History


Smoking Status:  Unknown if Ever Smoked


Have you traveled recently?:  No


Alcohol Use?:  Unable to obtain





Past Medical History


HTN, HLP, CHASTITY, RBBB





Review of Systems-General


Review of Systems


ROS-Unable to Obtain:  Unable to obtain full ROS d/t AMS


Constitutional:  see HPI, weakness


Respiratory:  no symptoms reported


Cardiovascular:  no symptoms reported


Gastrointestinal:  no symptoms reported


Genitourinary:  no symptoms reported


Pregnant:  No


Musculoskeletal:  no symptoms reported


Skin:  no symptoms reported


Psychiatric/Neurological:  See HPI





Reviewed Test Results


Reviewed Test Results


Lab


Laboratory Tests


2/22/23 08:47: 


White Blood Count 16.3H, Red Blood Count 4.21, Hemoglobin 13.8, Hematocrit 41, 

Mean Corpuscular Volume 97, Mean Corpuscular Hemoglobin 33, Mean Corpuscular 

Hemoglobin Concent 34, Red Cell Distribution Width 12.9, Platelet Count 140, 

Mean Platelet Volume 9.8, Immature Granulocyte % (Auto) 1, Neutrophils (%) 

(Auto) 85H, Lymphocytes (%) (Auto) 5L, Monocytes (%) (Auto) 9, Eosinophils (%) 

(Auto) 0, Basophils (%) (Auto) 0, Neutrophils # (Auto) 12.5H, Lymphocytes # 

(Auto) 0.7L, Monocytes # (Auto) 1.4H, Eosinophils # (Auto) 0.0, Basophils # 

(Auto) 0.0, Immature Granulocyte # (Auto) 0.1, Neutrophils % (Manual) 75, 

Lymphocytes % (Manual) 5, Monocytes % (Manual) 12, Eosinophils % (Manual) 0, 

Basophils % (Manual) 0, Band Neutrophils 8, Blood Morphology Comment NORMAL, 

Prothrombin Time 14.7, INR Comment 1.1, Activated Partial Thromboplast Time 27, 

Sodium Level 140, Potassium Level 3.4L, Chloride Level 106, Carbon Dioxide Level

22, Anion Gap 12, Blood Urea Nitrogen 15, Creatinine 0.70, Estimat Glomerular 

Filtration Rate 87, BUN/Creatinine Ratio 21, Glucose Level 108H, Calcium Level 

8.4L, Corrected Calcium 9.0, Magnesium Level 1.5L, Total Bilirubin 0.4, 

Aspartate Amino Transf (AST/SGOT) 24, Alanine Aminotransferase (ALT/SGPT) 11, 

Alkaline Phosphatase 46, C-Reactive Protein High Sensitivity 1.39H, Total 

Protein 7.0, Albumin 3.3, Thyroid Stimulating Hormone (TSH) 2.55, Free Thyroxine

0.84, Valproic Acid (Depakene) Level 77.1, Influenza Type A (RT-PCR) Not 

Detected, Influenza Type B (RT-PCR) Not Detected, SARS-CoV-2 RNA (RT-PCR) Not 

Detected


2/22/23 10:00: 


Urine Color YELLOW, Urine Clarity CLEAR, Urine pH 6.5, Urine Specific Gravity 

1.020, Urine Protein 1+H, Urine Glucose (UA) NEGATIVE, Urine Ketones NEGATIVE, 

Urine Nitrite POSITIVEH, Urine Bilirubin NEGATIVE, Urine Urobilinogen 0.2, Urine

Leukocyte Esterase NEGATIVE, Urine RBC (Auto) TRACE-IH, Urine RBC RARE, Urine 

WBC 5-10H, Urine Squamous Epithelial Cells RARE, Urine Crystals NONE, Urine 

Bacteria LARGEH, Urine Casts NONE, Urine Mucus NEGATIVE, Urine Culture Indicated

YES


2/22/23 12:16: Lactic Acid Level 3.01*H


2/22/23 14:28: 


Blood Gas Puncture Site LR, Blood Gas Patient Temperature 36.9, Arterial Blood 

pH 7.42, Arterial Blood Partial Pressure CO2 41, Arterial Blood Partial Pressure

O2 60L, Arterial Blood HCO3 26, Arterial Blood Total CO2 27.3, Arterial Blood 

Oxygen Saturation 92L, Arterial Blood Base Excess 1.9, Osbaldo Test YES-POS, Blood

Gas Ventilator Setting NO, Blood Gas Inspired Oxygen RA


2/22/23 14:42: 


Lactic Acid Level 1.87, B-Type Natriuretic Peptide 378.5H





ECG Impression


ECG


Initial ECG Rhythm:  S.Tach


Comment


Sinus tachycardia with sinus arrhythmia





Physical Exam


Physical Exam


Vital Signs





Vital Signs - First Documented








 2/22/23 2/22/23





 07:45 15:57


 


Temp 36.9 


 


Pulse 90 


 


Resp 14 


 


B/P (MAP) 128/87 (101) 


 


Pulse Ox  95


 


O2 Delivery Room Air 





Capillary Refill : Less Than 3 Seconds


Height, Weight, BMI


Height: '"


Weight: lbs. oz. kg;  BMI


Method:


General Appearance:  Mild Distress


Respiratory:  Lungs Clear, Normal Breath Sounds, No Accessory Muscle Use


Cardiovascular:  No Edema, No Murmur, Tachycardia


Neurologic/Psychiatric:  Disoriented


Skin:  Normal Color





A/P-Cardiology


Admission Diagnosis


UTI


Sepis


AMS


HTN





Assessment/Plan


UTI with severe sepsis, receiving IVF, antibiotics, management per medical 

services





AMS, likely secondary to severe sepsis. Does have dementia, but reported to be 

normally alert. 





Questionable CHF, BNP pending. Underwent 2D Echo on 12/12/22 showing normal LV 

size, EF 70-75%, mitral valve annulus with mild to moderate calcification. 





Sinus tachycardia, likely d/t underlying sepsis, continue to monitor HR. Has 

been maintainted on Toprol XL as outpatient





HTN, continue to monitor blood pressure





Abnormal baseline ECG (12/9/22): NSR with incomp RBBB vs RVH





Hyperlipidemia, maintained on statin as outpatient





CHASTITY





GERD





Dementia





Bipolar disorder








Thank you for allowing us to participate in the management of Ms. Pollock. 

This is Huma Rachel PA-C, as a scribe for Dr. Jennings.


Patient was seen and evaluated, I examined the patient and discussed the 

management plan with Huma and agree with the current scribed note


Patient is unable to provide any history, opening her eyes and not following 

commands, I reviewed the record


Had mild elevation in BNP.


2D echo was done in December 2022 showing normal LV size and function with EF 70

to 75%, recommend monitoring, management per medical team











HUMA MCNAMARA  Feb 22, 2023 15:37


SARAH JENNINGS MD              Feb 22, 2023 17:18

## 2023-02-22 NOTE — HISTORY & PHYSICAL
History of Present Illness


HPI/Chief Complaint


CC: AMS





HPI: This is an 82yoWF NHP at [facility] who presents to the ER after

found down at NH today by nursing staff. Patient had entire w/u but revealed no 

evidence of any type of fracture but she appears to be very confused and unable 

to carry on a conversation which is definitely not her baseline. Currently she 

is tracking my movements but really unable to converse well.


Source:  RN/MD


Exam Limitations:  clinical condition


Date Seen


2/22/23


Time Seen by a Provider:  15:00


Attending Physician


Shahida Machuca DO


PCP


Admitting Physician:


 








Attending Physician:


Referring Physician





Date of Admission








Home Medications & Allergies


Home Medications


Reviewed patient Home Medication Reconciliation performed by pharmacy medication

reconciliations technician and/or nursing.


Patients Allergies have been reviewed.





Allergies





Allergies


Coded Allergies


  codeine (Verified Allergy, Unknown, 2/22/23)


  lurasidone (Verified Allergy, Unknown, 2/22/23)


  quetiapine (Verified Allergy, Unknown, 2/22/23)


  trifluoperazine (Verified Allergy, Unknown, 2/22/23)








Past Medical-Social-Family Hx


Past Med/Social Hx:  Reviewed Nursing Past Med/Soc Hx, Reviewed and Corrections 

made


Patient Social History


Marrital Status:  single


Employed/Student:  retired


Alcohol Use:  Denies Use


Smoking Status:  Former Smoker





Past Medical History


Respiratory:  Sleep Apnea


Currently Using CPAP:  No


Currently Using BIPAP:  No


Cardiac:  High Cholesterol, Hypertension


Genitourinary:  Bladder Infection


Gastrointestinal:  Gastroesophageal Reflux


Psychosocial:  Depression





Review of Systems


ROS-Unable to Obtain:  confused


Constitutional:  see HPI





Physical Exam


Physical Exam


Vital Signs





Vital Signs - First Documented








 2/22/23 2/22/23





 07:45 15:57


 


Temp 36.9 


 


Pulse 90 


 


Resp 14 


 


B/P (MAP) 128/87 (101) 


 


Pulse Ox  95


 


O2 Delivery Room Air 





Capillary Refill : Less Than 3 Seconds


Height, Weight, BMI


Height: '"


Weight: lbs. oz. kg;  BMI


Method:


General Appearance:  Chronically ill, Mild Distress, Obese


Eyes:  Bilateral Eye Normal Inspection, Bilateral Eye PERRL


HEENT:  PERRL/EOMI, Normal ENT Inspection, Pharynx Normal


Neck:  Full Range of Motion, Normal Inspection, Non Tender, Supple, Carotid 

Bruit


Respiratory:  Lungs Clear, Normal Breath Sounds, No Accessory Muscle Use


Cardiovascular:  No Edema, No Murmur, Tachycardia


Gastrointestinal:  Normal Bowel Sounds, No Organomegaly, No Pulsatile Mass, Non 

Tender, Soft


Back:  Normal Inspection, No CVA Tenderness, No Vertebral Tenderness


Extremity:  Normal Capillary Refill, Normal Inspection, Normal Range of Motion, 

Non Tender, No Calf Tenderness, No Pedal Edema


Neurologic/Psychiatric:  Alert, Disoriented


Skin:  Normal Color


Lymphatic:  No Adenopathy





Results


Results/Procedures


Labs


Laboratory Tests


2/22/23 08:47








Patient resulted labs reviewed.





Assessment/Plan


Admission Diagnosis


Assessment:


AMS


Fall without injury


UTI


Sepsis


Mental illness


Dementia


CHASTITY


HTN


HLP


Obesity





Plan:


Monitor BP


CSD admit


Pain control


Admission Status:  Inpatient Order (span 2 midnights) (ams)


Reason for Inpatient Admission:  


ams with sepsis





Diagnosis/Problems


Diagnosis/Problems





(1) Altered mental status


Status:  Acute


Qualifiers:  


   Altered mental status type:  unspecified  Qualified Codes:  R41.82 - Altered 

mental status, unspecified


(2) Sepsis


(3) UTI (urinary tract infection)


Status:  Acute


Qualifiers:  


   Urinary tract infection type:  site unspecified  Hematuria presence:  without

hematuria  Qualified Codes:  N39.0 - Urinary tract infection, site not specified


(4) Fall on same level


Status:  Acute


Qualifiers:  


   Encounter type:  initial encounter  Qualified Codes:  W18.30XA - Fall on same

level, unspecified, initial encounter











SHAHIDA MACHUCA DO                Feb 22, 2023 15:41

## 2023-02-23 VITALS — SYSTOLIC BLOOD PRESSURE: 120 MMHG | DIASTOLIC BLOOD PRESSURE: 59 MMHG

## 2023-02-23 VITALS — SYSTOLIC BLOOD PRESSURE: 136 MMHG | DIASTOLIC BLOOD PRESSURE: 76 MMHG

## 2023-02-23 VITALS — SYSTOLIC BLOOD PRESSURE: 96 MMHG | DIASTOLIC BLOOD PRESSURE: 41 MMHG

## 2023-02-23 VITALS — DIASTOLIC BLOOD PRESSURE: 55 MMHG | SYSTOLIC BLOOD PRESSURE: 95 MMHG

## 2023-02-23 VITALS — SYSTOLIC BLOOD PRESSURE: 116 MMHG | DIASTOLIC BLOOD PRESSURE: 61 MMHG

## 2023-02-23 VITALS — DIASTOLIC BLOOD PRESSURE: 87 MMHG | SYSTOLIC BLOOD PRESSURE: 136 MMHG

## 2023-02-23 VITALS — DIASTOLIC BLOOD PRESSURE: 95 MMHG | SYSTOLIC BLOOD PRESSURE: 149 MMHG

## 2023-02-23 VITALS — SYSTOLIC BLOOD PRESSURE: 127 MMHG | DIASTOLIC BLOOD PRESSURE: 67 MMHG

## 2023-02-23 VITALS — SYSTOLIC BLOOD PRESSURE: 108 MMHG | DIASTOLIC BLOOD PRESSURE: 56 MMHG

## 2023-02-23 VITALS — DIASTOLIC BLOOD PRESSURE: 71 MMHG | SYSTOLIC BLOOD PRESSURE: 125 MMHG

## 2023-02-23 LAB
ALBUMIN SERPL-MCNC: 2.7 GM/DL (ref 3.2–4.5)
ALP SERPL-CCNC: 44 U/L (ref 40–136)
ALT SERPL-CCNC: 14 U/L (ref 0–55)
BASOPHILS # BLD AUTO: 0 10^3/UL (ref 0–0.1)
BASOPHILS NFR BLD AUTO: 0 % (ref 0–10)
BILIRUB SERPL-MCNC: 0.5 MG/DL (ref 0.1–1)
BUN/CREAT SERPL: 24
CALCIUM SERPL-MCNC: 7.8 MG/DL (ref 8.5–10.1)
CHLORIDE SERPL-SCNC: 104 MMOL/L (ref 98–107)
CO2 SERPL-SCNC: 23 MMOL/L (ref 21–32)
CREAT SERPL-MCNC: 0.82 MG/DL (ref 0.6–1.3)
EOSINOPHIL # BLD AUTO: 0 10^3/UL (ref 0–0.3)
EOSINOPHIL NFR BLD AUTO: 0 % (ref 0–10)
GFR SERPLBLD BASED ON 1.73 SQ M-ARVRAT: 72 ML/MIN
GLUCOSE SERPL-MCNC: 81 MG/DL (ref 70–105)
HCT VFR BLD CALC: 35 % (ref 35–52)
HGB BLD-MCNC: 11.5 G/DL (ref 11.5–16)
LYMPHOCYTES # BLD AUTO: 1.3 10^3/UL (ref 1–4)
LYMPHOCYTES NFR BLD AUTO: 9 % (ref 12–44)
MANUAL DIFFERENTIAL PERFORMED BLD QL: NO
MCH RBC QN AUTO: 32 PG (ref 25–34)
MCHC RBC AUTO-ENTMCNC: 33 G/DL (ref 32–36)
MCV RBC AUTO: 98 FL (ref 80–99)
MONOCYTES # BLD AUTO: 1.6 10^3/UL (ref 0–1)
MONOCYTES NFR BLD AUTO: 11 % (ref 0–12)
NEUTROPHILS # BLD AUTO: 12.1 10^3/UL (ref 1.8–7.8)
NEUTROPHILS NFR BLD AUTO: 80 % (ref 42–75)
PLATELET # BLD: 159 10^3/UL (ref 130–400)
PMV BLD AUTO: 10.3 FL (ref 9–12.2)
POTASSIUM SERPL-SCNC: 3.3 MMOL/L (ref 3.6–5)
PROT SERPL-MCNC: 5.7 GM/DL (ref 6.4–8.2)
SODIUM SERPL-SCNC: 138 MMOL/L (ref 135–145)
WBC # BLD AUTO: 15.1 10^3/UL (ref 4.3–11)

## 2023-02-23 RX ADMIN — DOCUSATE SODIUM SCH MG: 100 CAPSULE ORAL at 20:25

## 2023-02-23 RX ADMIN — SENNOSIDES SCH MG: 8.6 TABLET, FILM COATED ORAL at 20:25

## 2023-02-23 RX ADMIN — Medication SCH MG: at 16:59

## 2023-02-23 RX ADMIN — DOCUSATE SODIUM SCH MG: 100 CAPSULE ORAL at 09:10

## 2023-02-23 RX ADMIN — SENNOSIDES SCH MG: 8.6 TABLET, FILM COATED ORAL at 09:10

## 2023-02-23 RX ADMIN — MICONAZOLE NITRATE SCH GM: 20 POWDER TOPICAL at 20:25

## 2023-02-23 RX ADMIN — CEFTRIAXONE SCH MLS/HR: 1 INJECTION, SOLUTION INTRAVENOUS at 12:09

## 2023-02-23 RX ADMIN — ENOXAPARIN SODIUM SCH MG: 100 INJECTION SUBCUTANEOUS at 16:58

## 2023-02-23 RX ADMIN — SODIUM CHLORIDE SCH MLS/HR: 900 INJECTION, SOLUTION INTRAVENOUS at 09:15

## 2023-02-23 NOTE — OCCUPATIONAL THERAPY EVAL
OT Evaluation-General/PLF


Medical Diagnosis


Admission Date


Feb 22, 2023 at 16:00


Medical Diagnosis:  AMS/fall/sepsis


Onset Date:  Feb 22, 2023





Therapy Diagnosis


Therapy Diagnosis:  weakness





Precautions


Precautions/Isolations:  Fall Prevention, Standard Precautions





Referral


Physician:  Sven Gorman Reason:  Evaluation/Treatment





Medical History


Pertinent Medical History:  Dementia


Current History


s/p unwitnessed fall at NH


Reviewed History:  Yes





Social History


Home:  Nursing Home





ADL-Prior Level of Function


SCALE: Activities may be completed with or without assistive devices.





6-Indepedent-patient completes the activity by him/herself with no assistance 

from a helper.


5-Set-up or Clean-up Assistance-helper sets up or cleans up; patient completes 

activity. [place] assists only prior to or  


    following the activity.


4-Supervision or Touching Assistance-helper provides verbal cues and/or 

touching/steadying and/or contact guard assistance as patient completes 

activity. Assistance may be provided   


    throughout the activity or intermittently.


3-Partial/Moderate Assistance-helper does LESS THAN HALF the effort. [place] 

lifts, holds or supports trunk or limbs, but provides less than half the effort.


2-Substantial/Maximal Assistance-helper does MORE THAN HALF the effort. [place] 

lifts or holds trunk or limbs and provides more than half the effort.


5-Vjmyrxjcn-rfqjlh does ALL the effort. Patient does none of the effort to comp

lete the activity. Or, the assistance of 2 or more helpers is required for the 

patient to complete the  


    activity.


If activity was not attempted, code reason:


7-Patient Refused.


9-Not Applicable-not attempted and the patient did not perform the activity 

before the current illness, exacerbation or injury.


10-Not Attempted due to Environmental Limitations-(lack of equipment, weather 

restraints, etc.).


88-Not Attempted due to Medical Conditions or Safety Concerns.


Self Care:  Needed Some Help


Functional Cognition:  Needed Some Help


Ambulates w/ assistance w/ walker





OT Current Status


Subjective


Resting in bed request snack to eat, did not like lunch





Mental Status/Objective


Patient Orientation:  Person, Place, Time, Situation


Attachments:  IV, Telemetry (a fib)





Current


Glasses/Contacts:  Yes


Upper Extremity ROM


BUE ROM WFLS for level of assistance at NH


Upper Extremity Strength


3/5 grossly





ADL-Treatment


Eating (QC):  6


Oral Hygiene (QC):  5 (sitting level)


Shower/Bathe Self (QC):  88


Upper Body Dressing (QC):  3


Lower Body Dressing (QC):  3


On/Off Footwear (QC):  3


Toileting Hygiene (QC):  3





Education


OT Patient Education:  Correct positioning, Energy conservation, Modified ADL 

techniques, Progress toward Goal/Update tx plan, Purpose of tx/functional 

activities, Reviewed precautions, Rehab process, Safety issues, Transfer 

techniques, Use of adapted equipment


Teaching Recipient:  Patient


Teaching Methods:  Demonstration, Discussion


Response to Teaching:  Verbalize Understanding, Reinforcement Needed





OT Long Term Goals


Long Term Goals


Eating (QC):  6


Oral Hygiene (QC):  6


Toileting Hygiene (QC):  4


Shower/Bathe Self (QC):  3


Upper Body Dressing (QC):  5


Lower Body Dressing (QC):  4


On/Off Footwear (QC):  4


1=Demonstrate adherence to instructed precautions during ADL tasks.


2=Patient will verbalize/demonstrate understanding of assistive 

devices/modifications for ADL.


3=Patient will improve strength/tolerance for activity to enable patient to 

perform ADL's.





OT Education/Plan


Problem List/Assessment


Assessment:  Decreased Activ Tolerance, Decreased UE Strength, Impaired 

Coordination, Impaired Funct Balance, Impaired Self-Care Skills





Discharge Recommendations


Plan/Recommendations:  Continue POC





Treatment Plan/Plan of Care


Patient would benefit from OT for education, treatment and training to promote 

independence in ADL's, mobility, safety and/or upper extremity function for 

ADL's.


Plan of Care:  ADL Retraining, Functional Mobility, Group Exercise/Act as Ind, 

UE Funct Exercise/Act


Treatment Duration:  Mar 4, 2023


Frequency:  3 times per week (3-5 times per week)


Rehab Potential:  Fair





Time


Start Time:  14:00


Stop Time:  14:15


DATE:  Feb 23, 2023


Total Time Billed (hr/min):  14


Billed Treatment Time


1 EVM 1 15 min











DRINNEN,MICHELLE OT            Feb 23, 2023 15:01

## 2023-02-23 NOTE — TELE-ICU PROGRESS NOTE
Subjective


Date Seen by a Provider:  Feb 23, 2023


Time Seen by a Provider:  09:35


Subjective/Events-last exam





PATIENT IS STEP-DOWN  STATUS , WILL SIGN OFF 


CONTINUE TO MONITOR AS PER USUAL  TELE-ICU PROTOCOL WHILE LOCATED IN ICU








(Tele-ICU Physician , Progress Note )


Service provided via interactive audio and video telecommunications  E-CARE 

system to a patient admitted to ICU bed in [facility].


Patient is seen today due to persistent need of  ICU care





Available chart/ vitals / labs / Images reviewed


Video assessment done using   teleICU camera, rest of exam as per RN


Discussed with RN


Events overnight : 





Afebrile


hemodynamically stable


Respiratory - 2l











Hospital course:


2/22 81 y/o female from NH s/p fall  ,  severe sepsis UTI








A/P





Sepsis, severe


- IVF givem  BNP pending , lactate improved , vitals stable 





UTI


-  Rocephin





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 ? mild mass effect on 

the cerebellum





CHF, possible 


- Echo on 12/12/22 , EF 70-75%,


- cards consulted 





unwitnessed fall 


- CT  cervical spine- no acute finding 


- CTH - 





CHASTITY, reported


- ? CPAP 





dementia and bipolar


- baseline reported AOx2











VTE Prophylaxis: lov 40 


Stress Ulcer Prophylaxis: na














Plans in collaboration with   bedside consultants and IM MDs.


Discussed with RN to reach out if any questions or concerns


A total of 5 minutes of critical care time was devoted to this patient today, 

required to treat and/or prevent further deterioration of  critical care 

condition ( as above ) .





I am remotely monitoring this patient from another state.  I am unable to do the

bedside exam, and history/physical and pertinent information is taken from other

notes in the computer and bedside staff. .





Sepsis Event


Evaluation


Height, Weight, BMI


Height: '"


Weight: lbs. oz. kg; 35.54 BMI


Method:





Focused Exam


Lactate Level


2/22/23 12:16: Lactic Acid Level 3.01*H


2/22/23 14:42: Lactic Acid Level 1.87


Time of Focused Exam:  13:30





Exam


Exam


Patient acknowledged, consented, and participated in this virtual visit which 

was conducted using real time audio/video


Vital Signs








  Date Time  Temp Pulse Resp B/P (MAP) Pulse Ox O2 Delivery O2 Flow Rate FiO2


 


2/23/23 08:00  79 7 108/56 (73) 97 Nasal Cannula 2.00 


 


2/23/23 07:55 36.0 68 18 95/55 (68) 95 Nasal Cannula 2.00 


 


2/23/23 07:00  74      


 


2/23/23 04:26 36.7     Nasal Cannula 2.00 


 


2/23/23 04:00  68 18 116/61 (79) 99 Nasal Cannula 2.00 


 


2/23/23 01:12  73 21 120/59 (79) 99 Nasal Cannula 2.00 


 


2/23/23 01:00  56      


 


2/23/23 00:00  74 22 96/41 (59) 95 Nasal Cannula 4.00 


 


2/22/23 23:40 36.5     Nasal Cannula 4.00 


 


2/22/23 21:57     97 Room Air  


 


2/22/23 21:45      Nasal Cannula 4.00 


 


2/22/23 20:00  116 28 112/58 (76) 91 Room Air  


 


2/22/23 20:00 36.2     Room Air  


 


2/22/23 20:00     89 Room Air  


 


2/22/23 19:55 36.8       


 


2/22/23 19:30 36.2       


 


2/22/23 19:00  92      


 


2/22/23 17:09 38.2       


 


2/22/23 16:53 36.6       


 


2/22/23 16:44 36.9 99   93   


 


2/22/23 16:22  99      


 


2/22/23 16:17  94 32 142/61 (88) 93 Room Air  


 


2/22/23 15:57  96 30 122/54 95 Room Air  














I & O 


 


 2/23/23





 07:00


 


Intake Total 2250 ml


 


Output Total 150 ml


 


Balance 2100 ml








Height & Weight


Height: '"


Weight: lbs. oz. kg; 35.54 BMI


Method:


General Appearance:  Chronically ill, Mild Distress, Obese


HEENT:  PERRL/EOMI, Normal ENT Inspection, Pharynx Normal


Neck:  Full Range of Motion, Normal Inspection, Non Tender, Supple, Carotid 

Bruit


Respiratory:  Lungs Clear, Normal Breath Sounds, No Accessory Muscle Use


Cardiovascular:  No Edema, No Murmur, Tachycardia


Capillary Refill:  Less Than 3 Seconds


Gastrointestinal:  normal bowel sounds, non tender, soft


Extremity:  Normal Capillary Refill, Normal Inspection, Normal Range of Motion, 

Non Tender, No Calf Tenderness, No Pedal Edema


Neurologic/Psychiatric:  Alert, Disoriented


Skin:  Normal Color


Lymphatic:  No Adenopathy





Results


Lab


Laboratory Tests


2/22/23 08:47








2/23/23 03:31











Assessment/Plan


Assessment/Plan


1











MELODIE LARSEN MD         Feb 23, 2023 09:35

## 2023-02-23 NOTE — PROGRESS NOTE
Subjective


Date Seen by a Provider:  Feb 23, 2023


Time Seen by a Provider:  11:00


Subjective/Events-last exam


Mucn improved mentation


ECHO preformed


No pain reported


Transfer to [facility] has been help due to episodes of PAC and AF


Appreciate Dr Jennings


Review of Systems


General:  Fatigue, Malaise





Focused Exam


Lactate Level


2/22/23 12:16: Lactic Acid Level 3.01*H


2/22/23 14:42: Lactic Acid Level 1.87


Time of Focused Exam:  13:30





Objective


Exam


Last Set of Vital Signs





Vital Signs








  Date Time  Temp Pulse Resp B/P (MAP) Pulse Ox O2 Delivery O2 Flow Rate FiO2


 


2/23/23 08:00  79 7 108/56 (73) 97 Nasal Cannula 2.00 


 


2/23/23 07:55 36.0       





Capillary Refill : Less Than 3 Seconds


I&O











Intake and Output 


 


 2/23/23





 00:00


 


Intake Total 1750 ml


 


Balance 1750 ml


 


 


 


Intake Oral 600 ml


 


IV Total 1150 ml


 


# Voids 5


 


# Urine Diapers 2








General:  Alert, Cooperative, No Acute Distress, Other (O x 1)


Lungs:  Clear to Auscultation


Heart:  Regular Rate


Psych/Mental Status:  Mental Status NL





Results


Lab


Laboratory Tests


2/22/23 12:16: Lactic Acid Level 3.01*H


2/22/23 14:28: 


Blood Gas Puncture Site LR, Blood Gas Patient Temperature 36.9, Arterial Blood 

pH 7.42, Arterial Blood Partial Pressure CO2 41, Arterial Blood Partial Pressure

O2 60L, Arterial Blood HCO3 26, Arterial Blood Total CO2 27.3, Arterial Blood 

Oxygen Saturation 92L, Arterial Blood Base Excess 1.9, Osbaldo Test YES-POS, Blood

Gas Ventilator Setting NO, Blood Gas Inspired Oxygen RA


2/22/23 14:42: 


Lactic Acid Level 1.87, B-Type Natriuretic Peptide 378.5H


2/23/23 03:31: 


White Blood Count 15.1H, Red Blood Count 3.55L, Hemoglobin 11.5, Hematocrit 35, 

Mean Corpuscular Volume 98, Mean Corpuscular Hemoglobin 32, Mean Corpuscular 

Hemoglobin Concent 33, Red Cell Distribution Width 13.2, Platelet Count 159, 

Mean Platelet Volume 10.3, Immature Granulocyte % (Auto) 1, Neutrophils (%) 

(Auto) 80H, Lymphocytes (%) (Auto) 9L, Monocytes (%) (Auto) 11, Eosinophils (%) 

(Auto) 0, Basophils (%) (Auto) 0, Neutrophils # (Auto) 12.1H, Lymphocytes # 

(Auto) 1.3, Monocytes # (Auto) 1.6H, Eosinophils # (Auto) 0.0, Basophils # 

(Auto) 0.0, Immature Granulocyte # (Auto) 0.1, Sodium Level 138, Potassium Level

3.3L, Chloride Level 104, Carbon Dioxide Level 23, Anion Gap 11, Blood Urea 

Nitrogen 20H, Creatinine 0.82, Estimat Glomerular Filtration Rate 72, 

BUN/Creatinine Ratio 24, Glucose Level 81, Calcium Level 7.8L, Corrected Calcium

8.8, Total Bilirubin 0.5, Aspartate Amino Transf (AST/SGOT) 20, Alanine 

Aminotransferase (ALT/SGPT) 14, Alkaline Phosphatase 44, Total Protein 5.7L, 

Albumin 2.7L





Assessment/Plan


Assessment/Plan


Assess & Plan/Chief Complaint


Assessment:


AMS


Fall without injury


UTI


Sepsis


Mental illness


Dementia


CHASTITY


HTN


HLP


Obesity





Plan:


Monitor BP


CSD admit


Pain control





Diagnosis/Problems


Diagnosis/Problems





(1) Altered mental status


Status:  Acute


Qualifiers:  


   Qualified Codes:  R41.82 - Altered mental status, unspecified


(2) Sepsis


(3) UTI (urinary tract infection)


Status:  Acute


Qualifiers:  


   Qualified Codes:  N39.0 - Urinary tract infection, site not specified


(4) Fall on same level


Status:  Acute


Qualifiers:  


   Qualified Codes:  W18.30XA - Fall on same level, unspecified, initial enc

BRITTANY Haque DO                Feb 23, 2023 11:11

## 2023-02-23 NOTE — PHYSICAL THERAPY EVALUATION
PT Evaluation-General


Medical Diagnosis


Admission Date


Feb 22, 2023 at 16:00


Medical Diagnosis:  AMS/fall/sepsis


Onset Date:  Feb 22, 2023





Therapy Diagnosis


Therapy Diagnosis:  generalized weakness/debility





Precautions


Precautions/Isolations:  Fall Prevention, Standard Precautions





Referral


Physician:  Sven


Reason for Referral:  Evaluation/Treatment





Medical History


Pertinent Medical History:  Dementia


Current History


EMS from NH secondary to unwitnessed fall


Reviewed History:  Yes





Social History


Home:  Nursing Home





Prior


Prior Level of Function


SCALE: Activities may be completed with or without assistive devices.





6-Indepedent-patient completes the activity by him/herself with no assistance 

from a helper.


5-Set-up or Clean-up Assistance-helper sets up or cleans up; patient completes 

activity. [place] assists only prior to or  


    following the activity.


4-Supervision or Touching Assistance-helper provides verbal cues and/or 

touching/steadying and/or contact guard assistance as patient completes 

activity. Assistance may be provided   


    throughout the activity or intermittently.


3-Partial/Moderate Assistance-helper does LESS THAN HALF the effort. Garland lif

ts, holds or supports trunk or limbs, but provides less than half the effort.


2-Substantial/Maximal Assistance-helper does MORE THAN HALF the effort. [place] 

lifts or holds trunk or limbs and provides more than half the effort.


2-Xaacjdinx-icxmog does ALL the effort. Patient does none of the effort to 

complete the activity. Or, the assistance of 2 or more helpers is required for 

the patient to complete the  


    activity.


If activity was not attempted, code reason:


7-Patient Refused.


9-Not Applicable-not attempted and the patient did not perform the activity 

before the current illness, exacerbation or injury.


10-Not Attempted due to Environmental Limitations-(lack of equipment, weather 

restraints, etc.).


88-Not Attempted due to Medical Conditions or Safety Concerns.


Bed Mobility:  3


Transfers (B,C,W/C):  3


Gait:  3


Stairs:  9


Wheelchair Mobility:  3


Indoor Mobility (Ambulation):  Needed Some Help


Stairs:  Not [place]


Prior Devices Use:  Manual wheelchair, Walker





PT Evaluation-Current


Subjective


Patient agrees to PT.  Patient states, "I'm so hungry."  PT notified RN and RN 

called in for breakfast for patient.





Objective


Patient Orientation:  Person


Attachments:  Oxygen, IV





ROM/Strength


ROM Lower Extremities


bilateral LE WFL


Strength Lower Extremities


3/5 grossly bilateral LE all planes





Integumentary/Posture


Bladder Incontinence:  Yes


Posture


WFL





Neuromuscular


(Tone, Coordination, Reflexes)


grossly intact





Sensory


Vision:  Functional


Hearing:  Functional





Transfers


Lying to Sitting/Side of Bed(Q:  3


Sit to Stand (QC):  3


Chair/Bed-to-Chair Xfer(QC):  3





Gait


Mode of Locomotion:  Walk


Walk 10 feet (QC):  3


Walk 50 ft with 2 Turns(QC):  88


Walk 150 ft (QC):  88


Distance:  10'


Gait Assistive Device:  FWW


Comments/Gait Description


minimal assist for patient's safety





Balance


Sitting Static:  Normal


Sitting Dynamic:  Normal


Standing Static:  Fair


 Standing Dynamic:  Fair





Assessment/Needs


Patient will benefit from skilled PT to address functional strength and mobility

to improve current LOF.  Patient incontinent urine requiring dependent assist to

cleanse and change clothing.  Patient up in recliner with needs met.


Rehab Potential:  Fair





PT Long Term Goals


Long Term Goals


PT Long Term Goals Time Frame:  Mar 11, 2023


Roll Left & Right (QC):  4


Sit to Lying (QC):  4


Lying-Sitting on Side/Bed(QC):  4


Sit to Stand (QC):  4


Chair/Bed-to-Chair Xfer(QC):  4


Toilet Transfer (QC):  4


Walk 10 feet (QC):  4


Walk 50ft with 2 Turns (QC):  4





PT Plan


Problem List


Problem List:  Activity Tolerance, Functional Strength, Safety, Balance, Gait, 

Transfer, Bed Mobility





Treatment/Plan


Treatment Plan:  Continue Plan of Care


Treatment Plan:  Bed Mobility, Education, Functional Activity Jared, Functional 

Strength, Gait, Safety, Therapeutic Exercise, Transfers


Treatment Duration:  Mar 11, 2023


Frequency:  6 times per week


Estimated Hrs Per Day:  .25 hour per day


Patient and/or Family Agrees t:  Yes





Time


Time In:  818


Time Out:  834


DATE:  Feb 23, 2023


Total Billed Treatment Time:  16


Total Billed Treatment


1 visit


EVMod 16 min











JYOTHI PRETTY PT              Feb 23, 2023 10:48

## 2023-02-23 NOTE — CARDIOLOGY PROGRESS NOTE
Subjective


Date Seen by Provider:  Feb 23, 2023


Time Seen by Provider:  08:02


Subjective/Events-last exam


Patient was seen at bedside, laying down comfortably, feeling better


Still confused


Review of Systems


General:  No Chills, No Night Sweats; Fatigue, Malaise; No Appetite, No Other


HEENT:  No Head Aches, No Visual Changes, No Eye Pain, No Ear Pain, No 

Dysphasia, No Sinus Congestion, No Post Nasal Drip, No Sore Throat, No Other


Pulmonary:  Dyspnea; No Cough, No Pleuritic Chest Pain, No Other


Cardiovascular:  No: Chest Pain, Palpitations, Orthopnea, Paroxysmal Noc. 

Dyspnea, Edema, Lt Headedness, Other





Focused Exam


Lactate Level


2/22/23 12:16: Lactic Acid Level 3.01*H


2/22/23 14:42: Lactic Acid Level 1.87





Time of Focused Exam:  13:30





Objective-Cardiology


Exam


Last Set of Vital Signs





Vital Signs








 2/23/23





 07:55


 


Temp 36.0


 


Pulse 68


 


Resp 18


 


B/P (MAP) 95/55 (68)


 


Pulse Ox 95


 


O2 Delivery Nasal Cannula


 


O2 Flow Rate 2.00








I&O











Intake and Output 


 


 2/23/23





 00:00


 


Intake Total 1750 ml


 


Balance 1750 ml


 


 


 


Intake Oral 600 ml


 


IV Total 1150 ml


 


# Voids 5


 


# Urine Diapers 2








General:  Alert, Cooperative, No Acute Distress


HEENT:  Atraumatic


Neck:  Supple, No JVD


Lungs:  Normal Air Movement, Other (Bilateral rhonchi)


Heart:  Regular Rate, Normal S1, Normal S2


Abdomen:  Normal Bowel Sounds, Soft


Extremities:  No Clubbing, No Cyanosis


Skin:  No Rashes, No Breakdown


Neuro:  Normal Speech


Psych/Mental Status:  Mood NL





Results


Lab


Laboratory Tests


2/22/23 08:47








2/23/23 03:31











A/P-Cardiology


Admission Diagnosis


UTI


Sepis


AMS


HTN





Assessment/Plan


UTI with severe sepsis, receiving IVF, antibiotics, management per medical 

services





AMS, likely secondary to severe sepsis. Does have dementia, but reported to be 

normally alert. 





Questionable CHF, BNP pending. Underwent 2D Echo on 12/12/22 showing normal LV 

size, EF 70-75%, mitral valve annulus with mild to moderate calcification.


Clinically stable at this time.  Continue to monitor





Sinus tachycardia, likely d/t underlying sepsis, continue to monitor HR. Has 

been maintainted on Toprol XL as outpatient





HTN, continue to monitor blood pressure





Abnormal baseline ECG (12/9/22): NSR with incomp RBBB vs RVH





Hyperlipidemia, maintained on statin as outpatient





CHASTITY





GERD





Dementia





Bipolar disorder











SARAH GO MD              Feb 23, 2023 08:03

## 2023-02-24 VITALS — SYSTOLIC BLOOD PRESSURE: 147 MMHG | DIASTOLIC BLOOD PRESSURE: 88 MMHG

## 2023-02-24 VITALS — SYSTOLIC BLOOD PRESSURE: 118 MMHG | DIASTOLIC BLOOD PRESSURE: 63 MMHG

## 2023-02-24 VITALS — DIASTOLIC BLOOD PRESSURE: 73 MMHG | SYSTOLIC BLOOD PRESSURE: 134 MMHG

## 2023-02-24 VITALS — SYSTOLIC BLOOD PRESSURE: 147 MMHG | DIASTOLIC BLOOD PRESSURE: 65 MMHG

## 2023-02-24 VITALS — DIASTOLIC BLOOD PRESSURE: 63 MMHG | SYSTOLIC BLOOD PRESSURE: 118 MMHG

## 2023-02-24 LAB
ALBUMIN SERPL-MCNC: 2.7 GM/DL (ref 3.2–4.5)
ALP SERPL-CCNC: 52 U/L (ref 40–136)
ALT SERPL-CCNC: 14 U/L (ref 0–55)
BASOPHILS # BLD AUTO: 0 10^3/UL (ref 0–0.1)
BASOPHILS NFR BLD AUTO: 0 % (ref 0–10)
BILIRUB SERPL-MCNC: 0.3 MG/DL (ref 0.1–1)
BUN/CREAT SERPL: 22
CALCIUM SERPL-MCNC: 8.8 MG/DL (ref 8.5–10.1)
CHLORIDE SERPL-SCNC: 106 MMOL/L (ref 98–107)
CO2 SERPL-SCNC: 26 MMOL/L (ref 21–32)
CREAT SERPL-MCNC: 0.67 MG/DL (ref 0.6–1.3)
EOSINOPHIL # BLD AUTO: 0.1 10^3/UL (ref 0–0.3)
EOSINOPHIL NFR BLD AUTO: 2 % (ref 0–10)
GFR SERPLBLD BASED ON 1.73 SQ M-ARVRAT: 88 ML/MIN
GLUCOSE SERPL-MCNC: 96 MG/DL (ref 70–105)
HCT VFR BLD CALC: 35 % (ref 35–52)
HGB BLD-MCNC: 11.2 G/DL (ref 11.5–16)
LYMPHOCYTES # BLD AUTO: 1.5 10^3/UL (ref 1–4)
LYMPHOCYTES NFR BLD AUTO: 18 % (ref 12–44)
MANUAL DIFFERENTIAL PERFORMED BLD QL: NO
MCH RBC QN AUTO: 32 PG (ref 25–34)
MCHC RBC AUTO-ENTMCNC: 32 G/DL (ref 32–36)
MCV RBC AUTO: 97 FL (ref 80–99)
MONOCYTES # BLD AUTO: 0.8 10^3/UL (ref 0–1)
MONOCYTES NFR BLD AUTO: 10 % (ref 0–12)
NEUTROPHILS # BLD AUTO: 5.6 10^3/UL (ref 1.8–7.8)
NEUTROPHILS NFR BLD AUTO: 69 % (ref 42–75)
PLATELET # BLD: 180 10^3/UL (ref 130–400)
PMV BLD AUTO: 10.4 FL (ref 9–12.2)
POTASSIUM SERPL-SCNC: 3.8 MMOL/L (ref 3.6–5)
PROT SERPL-MCNC: 6 GM/DL (ref 6.4–8.2)
SODIUM SERPL-SCNC: 140 MMOL/L (ref 135–145)
WBC # BLD AUTO: 8.1 10^3/UL (ref 4.3–11)

## 2023-02-24 RX ADMIN — Medication SCH MG: at 09:01

## 2023-02-24 RX ADMIN — CARBOXYMETHYLCELLULOSE SODIUM SCH EACH: 5 SOLUTION/ DROPS OPHTHALMIC at 20:10

## 2023-02-24 RX ADMIN — ACETAMINOPHEN PRN MG: 325 TABLET ORAL at 05:25

## 2023-02-24 RX ADMIN — CARBOXYMETHYLCELLULOSE SODIUM SCH EACH: 5 SOLUTION/ DROPS OPHTHALMIC at 16:43

## 2023-02-24 RX ADMIN — MICONAZOLE NITRATE SCH GM: 20 POWDER TOPICAL at 09:01

## 2023-02-24 RX ADMIN — DOCUSATE SODIUM SCH MG: 100 CAPSULE ORAL at 20:09

## 2023-02-24 RX ADMIN — SENNOSIDES SCH MG: 8.6 TABLET, FILM COATED ORAL at 20:14

## 2023-02-24 RX ADMIN — FLUTICASONE PROPIONATE SCH SPRAY: 50 SPRAY, METERED NASAL at 20:11

## 2023-02-24 RX ADMIN — SENNOSIDES SCH MG: 8.6 TABLET, FILM COATED ORAL at 09:01

## 2023-02-24 RX ADMIN — DOCUSATE SODIUM SCH MG: 100 CAPSULE ORAL at 09:01

## 2023-02-24 RX ADMIN — POTASSIUM CHLORIDE SCH MEQ: 1500 TABLET, EXTENDED RELEASE ORAL at 05:23

## 2023-02-24 RX ADMIN — CEFTRIAXONE SCH MLS/HR: 1 INJECTION, SOLUTION INTRAVENOUS at 12:08

## 2023-02-24 RX ADMIN — Medication SCH MG: at 16:43

## 2023-02-24 RX ADMIN — MICONAZOLE NITRATE SCH APPLIC: 20 POWDER TOPICAL at 20:10

## 2023-02-24 RX ADMIN — CARBOXYMETHYLCELLULOSE SODIUM SCH EACH: 5 SOLUTION/ DROPS OPHTHALMIC at 14:26

## 2023-02-24 RX ADMIN — ALBUTEROL SULFATE SCH MG: 2.5 SOLUTION RESPIRATORY (INHALATION) at 21:44

## 2023-02-24 RX ADMIN — ENOXAPARIN SODIUM SCH MG: 100 INJECTION SUBCUTANEOUS at 16:43

## 2023-02-24 NOTE — CARDIOLOGY PROGRESS NOTE
Subjective


Date Seen by Provider:  Feb 24, 2023


Time Seen by Provider:  12:23


Subjective/Events-last exam


Patient was seen at bedside, laying down comfortably.  No new complain.


Review of Systems


General:  No Chills, No Night Sweats; Fatigue; No Malaise, No Appetite, No Other


HEENT:  No Head Aches, No Visual Changes, No Eye Pain, No Ear Pain, No 

Dysphasia, No Sinus Congestion, No Post Nasal Drip, No Sore Throat, No Other


Pulmonary:  Dyspnea; No Cough, No Pleuritic Chest Pain, No Other


Cardiovascular:  No: Chest Pain, Palpitations, Orthopnea, Paroxysmal Noc. 

Dyspnea, Edema, Lt Headedness, Other





Focused Exam


Lactate Level


2/22/23 12:16: Lactic Acid Level 3.01*H


2/22/23 14:42: Lactic Acid Level 1.87





Time of Focused Exam:  13:30





Objective-Cardiology


Exam


Last Set of Vital Signs





Vital Signs








 2/24/23 2/24/23 2/24/23





 08:00 10:59 11:02


 


Temp   36.7


 


Pulse   66


 


Resp 23  


 


B/P (MAP) 118/63 (81)  


 


Pulse Ox   96


 


O2 Delivery  Nasal Cannula 


 


O2 Flow Rate  3.00 


 


FiO2   32








I&O











Intake and Output 


 


 2/24/23





 00:00


 


Intake Total 4130 ml


 


Output Total 400 ml


 


Balance 3730 ml


 


 


 


Intake Oral 2960 ml


 


IV Total 1170 ml


 


Output Urine Total 400 ml


 


# Voids 5


 


# Urine Diapers 4








General:  Alert, Cooperative, No Acute Distress, Other (O x 1)


HEENT:  Atraumatic


Neck:  Supple, No JVD


Lungs:  Clear to Auscultation


Heart:  Regular Rate


Abdomen:  Normal Bowel Sounds, Soft


Extremities:  No Clubbing, No Cyanosis


Skin:  No Rashes, No Breakdown


Neuro:  Normal Speech


Psych/Mental Status:  Mental Status NL





Results


Lab


Laboratory Tests


2/24/23 04:44











A/P-Cardiology


Admission Diagnosis


UTI


Sepis


AMS


HTN





Assessment/Plan


UTI with severe sepsis, receiving IVF, antibiotics, management per medical 

services





AMS, likely secondary to severe sepsis. Does have dementia, but reported to be 

normally alert. 





Questionable CHF. 2D Echo on 12/12/22 showing normal LV size, EF 70-75%, mitral 

valve annulus with mild to moderate calcification.


Clinically stable at this time.  Continue to monitor





Sinus tachycardia, likely d/t underlying sepsis, continue to monitor HR. Has 

been maintainted on Toprol XL as outpatient





HTN, continue to monitor blood pressure





Abnormal baseline ECG (12/9/22): NSR with incomp RBBB vs RVH





Hyperlipidemia, maintained on statin as outpatient





CHASTITY





GERD





Dementia





Bipolar disorder











SARAH GO MD              Feb 24, 2023 12:23

## 2023-02-24 NOTE — PHYSICAL THERAPY DAILY NOTE
PT Daily Note-Current


Subjective


Patient agrees to PT and requests commode use.  Patient incontinent urine during

transfer





Pain


Section J - Health Conditions


1. Rarely or not at all


2. Occasionally


3. Frequently


4. Almost constantly


8. Unable to answer


Pain Effect on Sleep:  1


Pain Interference with Therapy:  1


Pain Interference w/Day-to-Day:  1





Mental Status


Patient Orientation:  Person


Attachments:  Oxygen, Sotelo Catheter, IV





Transfers


SCALE: Activities may be completed with or without assistive devices.





6-Indepedent-patient completes the activity by him/herself with no assistance 

from a helper.


5-Set-up or Clean-up Assistance-helper sets up or cleans up; patient completes 

activity. [place] assists only prior to or  


    following the activity.


4-Supervision or Touching Assistance-helper provides verbal cues and/or 

touching/steadying and/or contact guard assistance as patient completes 

activity. Assistance may be provided   


    throughout the activity or intermittently.


3-Partial/Moderate Assistance-helper does LESS THAN HALF the effort. Walsh 

lifts, holds or supports trunk or limbs, but provides less than half the effort.


2-Substantial/Maximal Assistance-helper does MORE THAN HALF the effort. Walsh 

lifts or holds trunk or limbs and provides more than half the effort.


8-Xufztwjnw-zdxwqb does ALL the effort. Patient does none of the effort to 

complete the activity. Or, the assistance of 2 or more helpers is required for 

the patient to complete the  


    activity.


If activity was not attempted, code reason:


7-Patient Refused.


9-Not Applicable-not attempted and the patient did not perform the activity 

before the current illness, exacerbation or injury.


10-Not Attempted due to Environmental Limitations-(lack of equipment, weather 

restraints, etc.).


88-Not Attempted due to Medical Conditions or Safety Concerns.


Lying to Sitting/Side of Bed(Q:  4


Sit to Stand (QC):  4


Chair/Bed-to-Chair Xfer(QC):  4


Toilet Transfer (QC):  4





Gait Training


Distance:  10'


Walk 10 feet (QC):  4


Gait Assistive Device:  FWW





Exercises


Standing:  Sit to Stand


Standing Reps:  3





Assessment


Patient incontinent urine during session requiring dependent assist to cleanse 

and change patient.  Patient fatigues with activity and is up in recliner with 

needs met.  Continue to increase activity as tolerated by patient.





PT Long Term Goals


Long Term Goals


PT Long Term Goals Time Frame:  Mar 11, 2023


Roll Left & Right (QC):  4


Sit to Lying (QC):  4


Lying-Sitting on Side/Bed(QC):  4


Sit to Stand (QC):  4


Chair/Bed-to-Chair Xfer(QC):  4


Toilet Transfer (QC):  4


Walk 10 feet (QC):  4


Walk 50ft with 2 Turns (QC):  4





PT Plan


Treatment/Plan


Treatment Plan:  Continue Plan of Care


Treatment Plan:  Bed Mobility, Education, Functional Activity Jared, Functional 

Strength, Gait, Safety, Therapeutic Exercise, Transfers


Treatment Duration:  Mar 11, 2023


Frequency:  6 times per week


Estimated Hrs Per Day:  .25 hour per day


Patient and/or Family Agrees t:  Yes





Time


Time In:  805


Time Out:  818


DATE:  Feb 24, 2023


Total Billed Treatment Time:  13


Total Billed Treatment


1 visit


FA 18 min











JYOTHI PRETTY PT              Feb 24, 2023 10:24

## 2023-02-24 NOTE — OCCUPATIONAL THER DAILY NOTE
OT Current Status-Daily Note


Subjective


Up on BSC. incont w/ BM





ADL-Treatment


Therapy Code Descriptions/Definitions 





Functional [place] Measure:


0=Not Assessed/NA        4=Minimal Assistance


1=Total Assistance        5=Supervision or Setup


2=Maximal Assistance  6=Modified [place]


3=Moderate Assistance 7=Complete IndependenceSCALE: Activities may be completed 

with or without assistive devices.





6-Indepedent-patient completes the activity by him/herself with no assistance 

from a helper.


5-Set-up or Clean-up Assistance-helper sets up or cleans up; patient completes 

activity. [place] assists only prior to or  


    following the activity.


4-Supervision or Touching Assistance-helper provides verbal cues and/or 

touching/steadying and/or contact guard assistance as patient completes 

activity. Assistance may be provided   


    throughout the activity or intermittently.


3-Partial/Moderate Assistance-helper does LESS THAN HALF the effort. [place]on 

lifts, holds or supports trunk or limbs, but provides less than half the effort.


2-Substantial/Maximal Assistance-helper does MORE THAN HALF the effort. [place] 

lifts or holds trunk or limbs and provides more than half the effort.


2-Gqiuimqpx-slgzea does ALL the effort. Patient does none of the effort to 

complete the activity. Or, the assistance of 2 or more helpers is required for 

the patient to complete the  


    activity.


If activity was not attempted, code reason:


7-Patient Refused.


9-Not Applicable-not attempted and the patient did not perform the activity 

before the current illness, exacerbation or injury.


10-Not Attempted due to Environmental Limitations-(lack of equipment, weather 

restraints, etc.).


88-Not Attempted due to Medical Conditions or Safety Concerns.


Eating (QC):  6


Oral Hygiene (QC):  5


Bathing Location:  Buttocks, Perineal Area (bath cloth  by OT)


Shower/Bathe Self (QC):  7


Upper Body Dressing (QC):  4


Lower Body Dressing (QC):  3 (Difficulty w/ feet insertion and rollingof brief 

on moist skin)


On/Off Footwear:  1


Toileting Hygiene (QC):  2


Toilet Transfer (QC):  4





Education


OT Patient Education:  Correct positioning, Modified ADL techniques, Progress 

toward Goal/Update tx plan, Purpose of tx/functional activities, Reviewed 

precautions, Rehab process, Safety issues, Transfer techniques, Use of adapted 

equipment


Teaching Recipient:  Patient


Teaching Methods:  Demonstration, Discussion


Response to Teaching:  Verbalize Understanding, Return Demonstration, 

Reinforcement Needed





OT Long Term Goals


Long Term Goals


Eating (QC):  6


Oral Hygiene (QC):  6


Toileting Hygiene (QC):  4


Shower/Bathe Self (QC):  3


Upper Body Dressing (QC):  5


Lower Body Dressing (QC):  4


On/Off Footwear (QC):  4


1=Demonstrate adherence to instructed precautions during ADL tasks.


2=Patient will verbalize/demonstrate understanding of assistive 

devices/modifications for ADL.


3=Patient will improve strength/tolerance for activity to enable patient to 

perform ADL's.





OT Education/Plan


Problem List/Assessment


Assessment:  Decreased Activ Tolerance, Decreased Safety Aware, Decreased UE 

Strength, Impaired Coordination, Impaired Funct Balance, Impaired Self-Care 

Skills, Restricted Funct UE ROM





Discharge Recommendations


Plan/Recommendations:  Continue POC





Treatment Plan/Plan of Care


Patient would benefit from OT for education, treatment and training to promote 

independence in ADL's, mobility, safety and/or upper extremity function for 

ADL's.


Plan of Care:  ADL Retraining, Functional Mobility, Group Exercise/Act as Ind, 

UE Funct Exercise/Act


Treatment Duration:  Mar 4, 2023


Frequency:  3 times per week (3-5 times per week)


Rehab Potential:  Fair


Up in recliner, requests blanket and coffee, all needs met





Time


Start Time:  07:52


Stop Time:  09:16


DATE:  Feb 24, 2023


Total Time Billed (hr/min):  24


Billed Treatment Time


1 visit ADL 2 24 min











DRINNEN,MICHELLE OT            Feb 24, 2023 09:15

## 2023-02-24 NOTE — TELE-ICU PROGRESS NOTE
Subjective


Date Seen by a Provider:  Feb 24, 2023


Time Seen by a Provider:  09:54


Subjective/Events-last exam


(Tele-ICU Physician ,  consultation) 





Available chart/ vitals / labs / Images reviewed 


H&P is from ER notes 


Patient's information available about PMH, allergy reviewed in EMR.  


ROS as per chart and RN report  





Video assessment done using teleICU camera, rest of exam as per RN  


Discussed with RN. 








She is a 82-year-old  female resident of [facility]

[place] where she had a fall under the weakness on weakness and she was brought 

to the emergency room evaluated treated with various scans and found in no acute

changes however she did have a urinary tract infection.  She is started on 

treatment.  She is admitted to ICU and started on physical therapy and 

Occupational Therapy and she is somewhat improving.  She is currently out of bed

to chair.





Impression 1.


1.  Severe sepsis improved with IV fluids and antibiotics.


2.  Urinary tract infection.


She is on Rocephin.


3.  Altered mental status most likely due to infection which is now somewhat 

improved.  She is at her baseline.


4.  History of dementia and bipolar disorder


Baseline is AAO x2.





VTE prophylaxis.  On Lovenox 40 mg subcutaneously.





Collaboration of care with bedside consultants and primary care physician.





cc time 15 minutes





Sepsis Event


Evaluation


Height, Weight, BMI


Height: '"


Weight: lbs. oz. kg; 35.70 BMI


Method:





Focused Exam


Lactate Level


2/22/23 12:16: Lactic Acid Level 3.01*H


2/22/23 14:42: Lactic Acid Level 1.87


Time of Focused Exam:  13:30





Exam


Exam


Patient acknowledged, consented, and participated in this virtual visit which 

was conducted using real time audio/video


Vital Signs








  Date Time  Temp Pulse Resp B/P (MAP) Pulse Ox O2 Delivery O2 Flow Rate FiO2


 


2/24/23 07:00  68      


 


2/24/23 03:40 36.6 62 27 134/73 (93) 96 Nasal Cannula 3.00 


 


2/24/23 01:00  80      


 


2/23/23 23:06 36.8 72 27 127/67 (87) 97 Nasal Cannula 3.00 


 


2/23/23 22:16      Nasal Cannula 4.00 


 


2/23/23 20:27 36.6 67 24 149/95 (113) 96 Nasal Cannula 2.00 


 


2/23/23 20:13      Room Air  


 


2/23/23 20:00     96 Nasal Cannula 2.00 


 


2/23/23 19:00  70      


 


2/23/23 15:51 36.4 83 28 136/76 (96) 98 Nasal Cannula 2.00 


 


2/23/23 12:39  80      


 


2/23/23 12:00 36.3       


 


2/23/23 12:00  86 33 136/87 (103) 97 Nasal Cannula 2.00 


 


2/23/23 11:00  64 16 125/71 (89) 95 Nasal Cannula 2.00 














I & O 


 


 2/24/23





 07:00


 


Intake Total 3880 ml


 


Output Total 950 ml


 


Balance 2930 ml








Height & Weight


Height: '"


Weight: lbs. oz. kg; 35.70 BMI


Method:


General Appearance:  Chronically ill, Mild Distress, Obese


HEENT:  PERRL/EOMI, Normal ENT Inspection, Pharynx Normal


Neck:  Full Range of Motion, Normal Inspection, Non Tender, Supple, Carotid 

Bruit


Respiratory:  Lungs Clear, Normal Breath Sounds, No Accessory Muscle Use


Cardiovascular:  No Edema, No Murmur, Tachycardia


Capillary Refill:  Less Than 3 Seconds


Gastrointestinal:  normal bowel sounds, non tender, soft


Extremity:  Normal Capillary Refill, Normal Inspection, Normal Range of Motion, 

Non Tender, No Calf Tenderness, No Pedal Edema


Neurologic/Psychiatric:  Alert, Disoriented


Skin:  Normal Color


Lymphatic:  No Adenopathy


Other comments


I am remotely monitoring this patient from another state.  I am unable to do the

bedside exam, and history/physical and pertinent information is taken from other

notes in the computer and bedside staff. .





Results


Lab


Laboratory Tests


2/23/23 03:31








2/24/23 04:44











Assessment/Plan


Assessment/Plan


as above


Critical Care:  Critically Ill Patient


Time spent with patient (mins):  15











TRI TROTTER MD                Feb 24, 2023 10:00

## 2023-02-24 NOTE — PROGRESS NOTE
Subjective


Date Seen by a Provider:  Feb 24, 2023


Time Seen by a Provider:  11:00


Subjective/Events-last exam


Doing much better


Moving to 4th floor


No pain


Improved status overall


Review of Systems


General:  Fatigue, Malaise


Neurological:  Confusion





Focused Exam


Lactate Level


2/22/23 12:16: Lactic Acid Level 3.01*H


2/22/23 14:42: Lactic Acid Level 1.87


Time of Focused Exam:  13:30





Objective


Exam


Last Set of Vital Signs





Vital Signs








  Date Time  Temp Pulse Resp B/P (MAP) Pulse Ox O2 Delivery O2 Flow Rate FiO2


 


2/24/23 11:02 36.7 66   96   32


 


2/24/23 10:59      Nasal Cannula 3.00 


 


2/24/23 08:00   23 118/63 (81)    





Capillary Refill : Less Than 3 Seconds


I&O











Intake and Output 


 


 2/23/23





 23:59


 


Intake Total 4130 ml


 


Output Total 400 ml


 


Balance 3730 ml


 


 


 


Intake Oral 2960 ml


 


IV Total 1170 ml


 


Output Urine Total 400 ml


 


# Voids 5


 


# Urine Diapers 4








General:  Alert, Oriented X3, Cooperative, No Acute Distress


Lungs:  Clear to Auscultation, Normal Air Movement


Heart:  Regular Rate, Normal S1, Normal S2, No Murmurs


Psych/Mental Status:  Mental Status NL, Mood NL





Results


Lab


Laboratory Tests


2/24/23 04:44: 


White Blood Count 8.1, Red Blood Count 3.56L, Hemoglobin 11.2L, Hematocrit 35, 

Mean Corpuscular Volume 97, Mean Corpuscular Hemoglobin 32, Mean Corpuscular 

Hemoglobin Concent 32, Red Cell Distribution Width 13.0, Platelet Count 180, 

Mean Platelet Volume 10.4, Immature Granulocyte % (Auto) 1, Neutrophils (%) 

(Auto) 69, Lymphocytes (%) (Auto) 18, Monocytes (%) (Auto) 10, Eosinophils (%) 

(Auto) 2, Basophils (%) (Auto) 0, Neutrophils # (Auto) 5.6, Lymphocytes # (Auto)

1.5, Monocytes # (Auto) 0.8, Eosinophils # (Auto) 0.1, Basophils # (Auto) 0.0, 

Immature Granulocyte # (Auto) 0.1, Sodium Level 140, Potassium Level 3.8, 

Chloride Level 106, Carbon Dioxide Level 26, Anion Gap 8, Blood Urea Nitrogen 

15, Creatinine 0.67, Estimat Glomerular Filtration Rate 88, BUN/Creatinine Ratio

22, Glucose Level 96, Calcium Level 8.8, Corrected Calcium 9.8, Total Bilirubin 

0.3, Aspartate Amino Transf (AST/SGOT) 16, Alanine Aminotransferase (ALT/SGPT) 

14, Alkaline Phosphatase 52, Total Protein 6.0L, Albumin 2.7L





Microbiology


2/22/23 Blood Culture - Preliminary, Resulted


          No growth


2/22/23 Urine Culture - Preliminary, Resulted


          Escherichia coli


          Susceptibility To Follow





Assessment/Plan


Assessment/Plan


Assess & Plan/Chief Complaint


Assessment:


AMS


Fall without injury


UTI


Sepsis


Mental illness


Dementia


CHASTITY


HTN


HLP


Obesity





Plan:


Monitor BP


Move to floor


Pain control





Diagnosis/Problems


Diagnosis/Problems





(1) Altered mental status


Status:  Acute


Qualifiers:  


   Qualified Codes:  R41.82 - Altered mental status, unspecified


(2) Sepsis


(3) UTI (urinary tract infection)


Status:  Acute


Qualifiers:  


   Qualified Codes:  N39.0 - Urinary tract infection, site not specified


(4) Fall on same level


Status:  Acute


Qualifiers:  


   Qualified Codes:  W18.30XA - Fall on same level, unspecified, initial 

encounter











BRITTANY LINDSEY DO                Feb 24, 2023 12:27

## 2023-02-25 VITALS — DIASTOLIC BLOOD PRESSURE: 69 MMHG | SYSTOLIC BLOOD PRESSURE: 143 MMHG

## 2023-02-25 VITALS — SYSTOLIC BLOOD PRESSURE: 159 MMHG | DIASTOLIC BLOOD PRESSURE: 67 MMHG

## 2023-02-25 VITALS — DIASTOLIC BLOOD PRESSURE: 74 MMHG | SYSTOLIC BLOOD PRESSURE: 122 MMHG

## 2023-02-25 VITALS — DIASTOLIC BLOOD PRESSURE: 73 MMHG | SYSTOLIC BLOOD PRESSURE: 145 MMHG

## 2023-02-25 VITALS — SYSTOLIC BLOOD PRESSURE: 122 MMHG | DIASTOLIC BLOOD PRESSURE: 74 MMHG

## 2023-02-25 LAB
ALBUMIN SERPL-MCNC: 2.7 GM/DL (ref 3.2–4.5)
ALP SERPL-CCNC: 47 U/L (ref 40–136)
ALT SERPL-CCNC: 15 U/L (ref 0–55)
BASOPHILS # BLD AUTO: 0 10^3/UL (ref 0–0.1)
BASOPHILS NFR BLD AUTO: 1 % (ref 0–10)
BILIRUB SERPL-MCNC: 0.3 MG/DL (ref 0.1–1)
BUN/CREAT SERPL: 18
CALCIUM SERPL-MCNC: 8.4 MG/DL (ref 8.5–10.1)
CHLORIDE SERPL-SCNC: 105 MMOL/L (ref 98–107)
CO2 SERPL-SCNC: 26 MMOL/L (ref 21–32)
CREAT SERPL-MCNC: 0.57 MG/DL (ref 0.6–1.3)
EOSINOPHIL # BLD AUTO: 0.2 10^3/UL (ref 0–0.3)
EOSINOPHIL NFR BLD AUTO: 3 % (ref 0–10)
GFR SERPLBLD BASED ON 1.73 SQ M-ARVRAT: 91 ML/MIN
GLUCOSE SERPL-MCNC: 79 MG/DL (ref 70–105)
HCT VFR BLD CALC: 33 % (ref 35–52)
HGB BLD-MCNC: 10.9 G/DL (ref 11.5–16)
LYMPHOCYTES # BLD AUTO: 1.9 10^3/UL (ref 1–4)
LYMPHOCYTES NFR BLD AUTO: 31 % (ref 12–44)
MANUAL DIFFERENTIAL PERFORMED BLD QL: NO
MCH RBC QN AUTO: 32 PG (ref 25–34)
MCHC RBC AUTO-ENTMCNC: 33 G/DL (ref 32–36)
MCV RBC AUTO: 98 FL (ref 80–99)
MONOCYTES # BLD AUTO: 0.4 10^3/UL (ref 0–1)
MONOCYTES NFR BLD AUTO: 6 % (ref 0–12)
NEUTROPHILS # BLD AUTO: 3.5 10^3/UL (ref 1.8–7.8)
NEUTROPHILS NFR BLD AUTO: 57 % (ref 42–75)
PLATELET # BLD: 187 10^3/UL (ref 130–400)
PMV BLD AUTO: 10.5 FL (ref 9–12.2)
POTASSIUM SERPL-SCNC: 4.1 MMOL/L (ref 3.6–5)
PROT SERPL-MCNC: 6 GM/DL (ref 6.4–8.2)
SODIUM SERPL-SCNC: 140 MMOL/L (ref 135–145)
WBC # BLD AUTO: 6 10^3/UL (ref 4.3–11)

## 2023-02-25 RX ADMIN — SENNOSIDES SCH MG: 8.6 TABLET, FILM COATED ORAL at 08:39

## 2023-02-25 RX ADMIN — FLUTICASONE PROPIONATE SCH SPRAY: 50 SPRAY, METERED NASAL at 08:41

## 2023-02-25 RX ADMIN — CEFTRIAXONE SCH MLS/HR: 1 INJECTION, SOLUTION INTRAVENOUS at 11:20

## 2023-02-25 RX ADMIN — CARBOXYMETHYLCELLULOSE SODIUM SCH EACH: 5 SOLUTION/ DROPS OPHTHALMIC at 08:41

## 2023-02-25 RX ADMIN — DOCUSATE SODIUM SCH MG: 100 CAPSULE ORAL at 08:41

## 2023-02-25 RX ADMIN — MICONAZOLE NITRATE SCH APPLIC: 20 POWDER TOPICAL at 08:41

## 2023-02-25 RX ADMIN — POTASSIUM CHLORIDE SCH MEQ: 1500 TABLET, EXTENDED RELEASE ORAL at 05:55

## 2023-02-25 RX ADMIN — ALBUTEROL SULFATE SCH MG: 2.5 SOLUTION RESPIRATORY (INHALATION) at 09:17

## 2023-02-25 RX ADMIN — Medication SCH MG: at 08:39

## 2023-02-25 RX ADMIN — CARBOXYMETHYLCELLULOSE SODIUM SCH EACH: 5 SOLUTION/ DROPS OPHTHALMIC at 13:05

## 2023-02-25 NOTE — DISCHARGE SUMMARY
Diagnosis/Chief Complaint


Date of Admission


Feb 22, 2023 at 16:00


Date of Discharge





Discharge Date:  Feb 25, 2023


Discharge Diagnosis


Assessment:


AMS


Fall without injury


UTI


Sepsis


Mental illness


Dementia


CHASTITY


HTN


HLP


Obesity





Discharge Summary


Discharge Physical Examination


Allergies:  


Coded Allergies:  


     codeine (Verified  Allergy, Unknown, 2/22/23)


     lurasidone (Verified  Allergy, Unknown, 2/22/23)


     quetiapine (Verified  Allergy, Unknown, 2/22/23)


     trifluoperazine (Verified  Allergy, Unknown, 2/22/23)


Vitals & I&Os





Vital Signs








  Date Time  Temp Pulse Resp B/P (MAP) Pulse Ox O2 Delivery O2 Flow Rate FiO2


 


2/25/23 14:24 36.8 67 19 122/74 92 Nasal Cannula 2.50 


 


2/24/23 11:02        32








General Appearance:  Alert, Cooperative


Respiratory:  Clear to Auscultation


Cardiovascular:  Regular Rate


Psych/Mental Status:  Mental Status NL





Hospital Course


Was the Problem List Reviewed?:  Yes


Uneventful course after she was admitted for AMS and found down at NH. Rocephin 

initiated empirically and Ucx reviewed and Omnicef ordered at [place]. PT OT ordered 

and patient did well and confusion resolved and she was back to her baseline.


Labs (last 24 hrs)


Laboratory Tests


2/22/23 08:47: 


White Blood Count 16.3H, Red Blood Count 4.21, Hemoglobin 13.8, Hematocrit 41, 

Mean Corpuscular Volume 97, Mean Corpuscular Hemoglobin 33, Mean Corpuscular 

Hemoglobin Concent 34, Red Cell Distribution Width 12.9, Platelet Count 140, 

Mean Platelet Volume 9.8, Immature Granulocyte % (Auto) 1, Neutrophils (%) 

(Auto) 85H, Lymphocytes (%) (Auto) 5L, Monocytes (%) (Auto) 9, Eosinophils (%) 

(Auto) 0, Basophils (%) (Auto) 0, Neutrophils # (Auto) 12.5H, Lymphocytes # 

(Auto) 0.7L, Monocytes # (Auto) 1.4H, Eosinophils # (Auto) 0.0, Basophils # 

(Auto) 0.0, Immature Granulocyte # (Auto) 0.1, Neutrophils % (Manual) 75, 

Lymphocytes % (Manual) 5, Monocytes % (Manual) 12, Eosinophils % (Manual) 0, 

Basophils % (Manual) 0, Band Neutrophils 8, Blood Morphology Comment NORMAL, 

Prothrombin Time 14.7, INR Comment 1.1, Activated Partial Thromboplast Time 27, 

Sodium Level 140, Potassium Level 3.4L, Chloride Level 106, Carbon Dioxide Level

22, Anion Gap 12, Blood Urea Nitrogen 15, Creatinine 0.70, Estimat Glomerular 

Filtration Rate 87, BUN/Creatinine Ratio 21, Glucose Level 108H, Calcium Level 

8.4L, Corrected Calcium 9.0, Magnesium Level 1.5L, Total Bilirubin 0.4, 

Aspartate Amino Transf (AST/SGOT) 24, Alanine Aminotransferase (ALT/SGPT) 11, 

Alkaline Phosphatase 46, C-Reactive Protein High Sensitivity 1.39H, Total 

Protein 7.0, Albumin 3.3, Thyroid Stimulating Hormone (TSH) 2.55, Free Thyroxine

0.84, Valproic Acid (Depakene) Level 77.1, Influenza Type A (RT-PCR) Not 

Detected, Influenza Type B (RT-PCR) Not Detected, SARS-CoV-2 RNA (RT-PCR) Not 

Detected


2/22/23 10:00: 


Urine Color YELLOW, Urine Clarity CLEAR, Urine pH 6.5, Urine Specific Gravity 

1.020, Urine Protein 1+H, Urine Glucose (UA) NEGATIVE, Urine Ketones NEGATIVE, 

Urine Nitrite POSITIVEH, Urine Bilirubin NEGATIVE, Urine Urobilinogen 0.2, Urine

Leukocyte Esterase NEGATIVE, Urine RBC (Auto) TRACE-IH, Urine RBC RARE, Urine 

WBC 5-10H, Urine Squamous Epithelial Cells RARE, Urine Crystals NONE, Urine 

Bacteria LARGEH, Urine Casts NONE, Urine Mucus NEGATIVE, Urine Culture Indicated

YES


2/22/23 12:16: Lactic Acid Level 3.01*H


2/22/23 14:28: 


Blood Gas Puncture Site LR, Blood Gas Patient Temperature 36.9, Arterial Blood 

pH 7.42, Arterial Blood Partial Pressure CO2 41, Arterial Blood Partial Pressure

O2 60L, Arterial Blood HCO3 26, Arterial Blood Total CO2 27.3, Arterial Blood 

Oxygen Saturation 92L, Arterial Blood Base Excess 1.9, Osbaldo Test YES-POS, Blood

Gas Ventilator Setting NO, Blood Gas Inspired Oxygen RA


2/22/23 14:42: 


Lactic Acid Level 1.87, B-Type Natriuretic Peptide 378.5H


2/23/23 03:31: 


White Blood Count 15.1H, Red Blood Count 3.55L, Hemoglobin 11.5, Hematocrit 35, 

Mean Corpuscular Volume 98, Mean Corpuscular Hemoglobin 32, Mean Corpuscular 

Hemoglobin Concent 33, Red Cell Distribution Width 13.2, Platelet Count 159, 

Mean Platelet Volume 10.3, Immature Granulocyte % (Auto) 1, Neutrophils (%) (A

uto) 80H, Lymphocytes (%) (Auto) 9L, Monocytes (%) (Auto) 11, Eosinophils (%) 

(Auto) 0, Basophils (%) (Auto) 0, Neutrophils # (Auto) 12.1H, Lymphocytes # 

(Auto) 1.3, Monocytes # (Auto) 1.6H, Eosinophils # (Auto) 0.0, Basophils # 

(Auto) 0.0, Immature Granulocyte # (Auto) 0.1, Sodium Level 138, Potassium Level

3.3L, Chloride Level 104, Carbon Dioxide Level 23, Anion Gap 11, Blood Urea 

Nitrogen 20H, Creatinine 0.82, Estimat Glomerular Filtration Rate 72, 

BUN/Creatinine Ratio 24, Glucose Level 81, Calcium Level 7.8L, Corrected Calcium

8.8, Total Bilirubin 0.5, Aspartate Amino Transf (AST/SGOT) 20, Alanine 

Aminotransferase (ALT/SGPT) 14, Alkaline Phosphatase 44, Total Protein 5.7L, 

Albumin 2.7L


2/24/23 04:44: 


White Blood Count 8.1, Red Blood Count 3.56L, Hemoglobin 11.2L, Hematocrit 35, 

Mean Corpuscular Volume 97, Mean Corpuscular Hemoglobin 32, Mean Corpuscular 

Hemoglobin Concent 32, Red Cell Distribution Width 13.0, Platelet Count 180, 

Mean Platelet Volume 10.4, Immature Granulocyte % (Auto) 1, Neutrophils (%) 

(Auto) 69, Lymphocytes (%) (Auto) 18, Monocytes (%) (Auto) 10, Eosinophils (%) 

(Auto) 2, Basophils (%) (Auto) 0, Neutrophils # (Auto) 5.6, Lymphocytes # (Auto)

1.5, Monocytes # (Auto) 0.8, Eosinophils # (Auto) 0.1, Basophils # (Auto) 0.0, 

Immature Granulocyte # (Auto) 0.1, Sodium Level 140, Potassium Level 3.8, 

Chloride Level 106, Carbon Dioxide Level 26, Anion Gap 8, Blood Urea Nitrogen 

15, Creatinine 0.67, Estimat Glomerular Filtration Rate 88, BUN/Creatinine Ratio

22, Glucose Level 96, Calcium Level 8.8, Corrected Calcium 9.8, Total Bilirubin 

0.3, Aspartate Amino Transf (AST/SGOT) 16, Alanine Aminotransferase (ALT/SGPT) 

14, Alkaline Phosphatase 52, Total Protein 6.0L, Albumin 2.7L


2/25/23 05:27: 


White Blood Count 6.0, Red Blood Count 3.40L, Hemoglobin 10.9L, Hematocrit 33L, 

Mean Corpuscular Volume 98, Mean Corpuscular Hemoglobin 32, Mean Corpuscular 

Hemoglobin Concent 33, Red Cell Distribution Width 13.0, Platelet Count 187, 

Mean Platelet Volume 10.5, Immature Granulocyte % (Auto) 2, Neutrophils (%) 

(Auto) 57, Lymphocytes (%) (Auto) 31, Monocytes (%) (Auto) 6, Eosinophils (%) 

(Auto) 3, Basophils (%) (Auto) 1, Neutrophils # (Auto) 3.5, Lymphocytes # (Auto)

1.9, Monocytes # (Auto) 0.4, Eosinophils # (Auto) 0.2, Basophils # (Auto) 0.0, 

Immature Granulocyte # (Auto) 0.1, Sodium Level 140, Potassium Level 4.1, 

Chloride Level 105, Carbon Dioxide Level 26, Anion Gap 9, Blood Urea Nitrogen 

10, Creatinine 0.57L, Estimat Glomerular Filtration Rate 91, BUN/Creatinine 

Ratio 18, Glucose Level 79, Calcium Level 8.4L, Corrected Calcium 9.4, Total 

Bilirubin 0.3, Aspartate Amino Transf (AST/SGOT) 20, Alanine Aminotransferase 

(ALT/SGPT) 15, Alkaline Phosphatase 47, Total Protein 6.0L, Albumin 2.7L





Microbiology


2/22/23 Blood Culture - Preliminary, Resulted


          No growth


2/22/23 Urine Culture - Final, Complete


          Escherichia coli


          Escherichia coli#2





Pending Labs





Microbiology








 Date/Time


Source Procedure


Growth Status





 


 2/22/23 12:25


Peripheral Rt Hand Blood Culture - Preliminary


No growth Resulted


 


 2/22/23 12:16


Peripheral Rt Hand Blood Culture - Preliminary


No growth Resulted


 


 2/22/23 10:00


Urine Straight Cath, In/Out Urine Culture - Final


Escherichia coli


Escherichia coli#2 Complete





Laboratory Tests


2/22/23 08:47: 


White Blood Count 16.3, Red Blood Count 4.21, Hemoglobin 13.8, Hematocrit 41, 

Mean Corpuscular Volume 97, Mean Corpuscular Hemoglobin 33, Mean Corpuscular 

Hemoglobin Concent 34, Red Cell Distribution Width 12.9, Platelet Count 140, 

Mean Platelet Volume 9.8, Immature Granulocyte % (Auto) 1, Neutrophils (%) 

(Auto) 85, Lymphocytes (%) (Auto) 5, Monocytes (%) (Auto) 9, Eosinophils (%) 

(Auto) 0, Basophils (%) (Auto) 0, Neutrophils # (Auto) 12.5, Lymphocytes # 

(Auto) 0.7, Monocytes # (Auto) 1.4, Eosinophils # (Auto) 0.0, Basophils # (Auto)

0.0, Immature Granulocyte # (Auto) 0.1, Neutrophils % (Manual) 75, Lymphocytes %

(Manual) 5, Monocytes % (Manual) 12, Eosinophils % (Manual) 0, Basophils % 

(Manual) 0, Band Neutrophils 8, Blood Morphology Comment NORMAL, Prothrombin 

Time 14.7, INR Comment 1.1, Activated Partial Thromboplast Time 27, Sodium Level

140, Potassium Level 3.4, Chloride Level 106, Carbon Dioxide Level 22, Anion Gap

12, Blood Urea Nitrogen 15, Creatinine 0.70, Estimat Glomerular Filtration Rate 

87, BUN/Creatinine Ratio 21, Glucose Level 108, Calcium Level 8.4, Corrected 

Calcium 9.0, Magnesium Level 1.5, Total Bilirubin 0.4, Aspartate Amino Transf 

(AST/SGOT) 24, Alanine Aminotransferase (ALT/SGPT) 11, Alkaline Phosphatase 46, 

C-Reactive Protein High Sensitivity 1.39, Total Protein 7.0, Albumin 3.3, Thyro

id Stimulating Hormone (TSH) 2.55, Free Thyroxine 0.84, Valproic Acid (Depakene)

Level 77.1, Influenza Type A (RT-PCR) Not Detected, Influenza Type B (RT-PCR) 

Not Detected, SARS-CoV-2 RNA (RT-PCR) Not Detected


2/22/23 10:00: 


Urine Color YELLOW, Urine Clarity CLEAR, Urine pH 6.5, Urine Specific Gravity 

1.020, Urine Protein 1+, Urine Glucose (UA) NEGATIVE, Urine Ketones NEGATIVE, 

Urine Nitrite POSITIVE, Urine Bilirubin NEGATIVE, Urine Urobilinogen 0.2, Urine 

Leukocyte Esterase NEGATIVE, Urine RBC (Auto) TRACE-I, Urine RBC RARE, Urine WBC

5-10, Urine Squamous Epithelial Cells RARE, Urine Crystals NONE, Urine Bacteria 

LARGE, Urine Casts NONE, Urine Mucus NEGATIVE, Urine Culture Indicated YES


2/22/23 12:16: Lactic Acid Level 3.01


2/22/23 14:28: 


Blood Gas Puncture Site LR, Blood Gas Patient Temperature 36.9, Arterial Blood 

pH 7.42, Arterial Blood Partial Pressure CO2 41, Arterial Blood Partial Pressure

O2 60, Arterial Blood HCO3 26, Arterial Blood Total CO2 27.3, Arterial Blood 

Oxygen Saturation 92, Arterial Blood Base Excess 1.9, Osbaldo Test YES-POS, Blood 

Gas Ventilator Setting NO, Blood Gas Inspired Oxygen RA


2/22/23 14:42: 


Lactic Acid Level 1.87, B-Type Natriuretic Peptide 378.5


2/23/23 03:31: 


White Blood Count 15.1, Red Blood Count 3.55, Hemoglobin 11.5, Hematocrit 35, 

Mean Corpuscular Volume 98, Mean Corpuscular Hemoglobin 32, Mean Corpuscular 

Hemoglobin Concent 33, Red Cell Distribution Width 13.2, Platelet Count 159, 

Mean Platelet Volume 10.3, Immature Granulocyte % (Auto) 1, Neutrophils (%) 

(Auto) 80, Lymphocytes (%) (Auto) 9, Monocytes (%) (Auto) 11, Eosinophils (%) 

(Auto) 0, Basophils (%) (Auto) 0, Neutrophils # (Auto) 12.1, Lymphocytes # 

(Auto) 1.3, Monocytes # (Auto) 1.6, Eosinophils # (Auto) 0.0, Basophils # (Auto)

0.0, Immature Granulocyte # (Auto) 0.1, Sodium Level 138, Potassium Level 3.3, 

Chloride Level 104, Carbon Dioxide Level 23, Anion Gap 11, Blood Urea Nitrogen 

20, Creatinine 0.82, Estimat Glomerular Filtration Rate 72, BUN/Creatinine Ratio

24, Glucose Level 81, Calcium Level 7.8, Corrected Calcium 8.8, Total Bilirubin 

0.5, Aspartate Amino Transf (AST/SGOT) 20, Alanine Aminotransferase (ALT/SGPT) 

14, Alkaline Phosphatase 44, Total Protein 5.7, Albumin 2.7


2/24/23 04:44: 


White Blood Count 8.1, Red Blood Count 3.56, Hemoglobin 11.2, Hematocrit 35, 

Mean Corpuscular Volume 97, Mean Corpuscular Hemoglobin 32, Mean Corpuscular 

Hemoglobin Concent 32, Red Cell Distribution Width 13.0, Platelet Count 180, 

Mean Platelet Volume 10.4, Immature Granulocyte % (Auto) 1, Neutrophils (%) 

(Auto) 69, Lymphocytes (%) (Auto) 18, Monocytes (%) (Auto) 10, Eosinophils (%) 

(Auto) 2, Basophils (%) (Auto) 0, Neutrophils # (Auto) 5.6, Lymphocytes # (Auto)

1.5, Monocytes # (Auto) 0.8, Eosinophils # (Auto) 0.1, Basophils # (Auto) 0.0, 

Immature Granulocyte # (Auto) 0.1, Sodium Level 140, Potassium Level 3.8, 

Chloride Level 106, Carbon Dioxide Level 26, Anion Gap 8, Blood Urea Nitrogen 

15, Creatinine 0.67, Estimat Glomerular Filtration Rate 88, BUN/Creatinine Ratio

22, Glucose Level 96, Calcium Level 8.8, Corrected Calcium 9.8, Total Bilirubin 

0.3, Aspartate Amino Transf (AST/SGOT) 16, Alanine Aminotransferase (ALT/SGPT) 

14, Alkaline Phosphatase 52, Total Protein 6.0, Albumin 2.7


2/25/23 05:27: 


White Blood Count 6.0, Red Blood Count 3.40, Hemoglobin 10.9, Hematocrit 33, 

Mean Corpuscular Volume 98, Mean Corpuscular Hemoglobin 32, Mean Corpuscular 

Hemoglobin Concent 33, Red Cell Distribution Width 13.0, Platelet Count 187, 

Mean Platelet Volume 10.5, Immature Granulocyte % (Auto) 2, Neutrophils (%) 

(Auto) 57, Lymphocytes (%) (Auto) 31, Monocytes (%) (Auto) 6, Eosinophils (%) 

(Auto) 3, Basophils (%) (Auto) 1, Neutrophils # (Auto) 3.5, Lymphocytes # (Auto)

1.9, Monocytes # (Auto) 0.4, Eosinophils # (Auto) 0.2, Basophils # (Auto) 0.0, 

Immature Granulocyte # (Auto) 0.1, Sodium Level 140, Potassium Level 4.1, 

Chloride Level 105, Carbon Dioxide Level 26, Anion Gap 9, Blood Urea Nitrogen 

10, Creatinine 0.57, Estimat Glomerular Filtration Rate 91, BUN/Creatinine Ratio

18, Glucose Level 79, Calcium Level 8.4, Corrected Calcium 9.4, Total Bilirubin 

0.3, Aspartate Amino Transf (AST/SGOT) 20, Alanine Aminotransferase (ALT/SGPT) 

15, Alkaline Phosphatase 47, Total Protein 6.0, Albumin 2.7








Discharge


Home Medications:





Active Scripts


Active


Cefdinir 300 Mg Capsule 300 Mg PO BID


Reported


Nicotine Patch (Nicotine) 21 Mg/24 Hour Patch.td24 21 Mg TD DAILY PRN


Milk of Magnesia (Magnesium Hydroxide) 400 Mg/5 Ml Oral.susp 30 Ml PO DAILY PRN


[place]yr Saline (Sodium Chloride) 0.65 % Spray 1 Spray NSEACH UD PRN


Tylenol Arthritis (Acetaminophen) 650 Mg Tablet.er 650 Mg PO Q4H PRN


Lubricant Eye Drops (Carboxymethylcellulose Sodium) 0.5 % Drops 1 Drop OU QID


Haloperidol Lactate 2 Mg/Ml Oral.conc 0.5 Ml PO BID


     MAY PUT IN 6OZ OF ANY PREFERRED BEVERAGE


Flonase Allergy Relief (Fluticasone Propionate) 50 Mcg/Actuation Spray.susp 1 

Spray NSEACH BID


Vitamin B Complex 1 Each Tablet 1 Each PO DAILY


Probiotic Digest Supp 20B Cfu (L. Rhamnosus GG/Inulin) 20 Billion Cell-200 Mg 

Capsule 1 Each PO DAILY


Miralax (Polyethylene Glycol 3350) 17 Gram Powd.pack 17 Gm PO DAILY


Omeprazole 40 Mg Capsule.dr 40 Mg PO DAILY


One Daily Plus Minerals (Multivitamin with Minerals) 1 Each Tablet 1 Each PO 

DAILY


Metoprolol Succinate 25 Mg Tab.er.24h 25 Mg PO DAILY


     HOLD FOR PULSE <60 NOTIFY NURSE IF HELD


Lutein 20 Mg Tablet 20 Mg PO DAILY


Xalatan (Latanoprost) 0.005 % Drops 1 Drop OU HS


Klor-Con 10 (Potassium Chloride) 10 Meq Tablet.er 10 Meq PO DAILY


Furosemide 40 Mg Tablet 40 Mg PO DAILY


Ferosul (Ferrous Sulfate) 325 Mg (65 Mg Iron) Tablet 325 Mg PO DAILY


Levothyroxine Sodium 112 Mcg Tablet 112 Mcg PO DAILY


Divalproex Sodium 500 Mg Tablet.dr 1,500 Mg PO HS


     TAKES 3 (500MG) TABS


Vitamin D3 (Cholecalciferol (Vitamin D3)) 50 Mcg (2000 Unit) Capsule 50 Mcg PO 

DAILY


Losartan Potassium 100 Mg Tablet 100 Mg PO DAILY


     HOLD FOR SBP <100 OR PULSE <60- NOTIFY NURSE IF MEDICATION IS HELD


     NOTIFY PCP IF HELD FOR 3 CONSECUTIVE DAYS


Calcium 600 + Vit D 200 Tablet (Calcium Carbonate/Vitamin D3) 600 Mg Calcium-5 

Mcg (200 Unit) Tablet 1 Each PO DAILY


Atorvastatin Calcium 40 Mg Tablet 40 Mg PO DAILY


Aspirin EC (Aspirin) 81 Mg Tablet.dr 81 Mg PO DAILY PRN


Alendronate Sodium 70 Mg Tablet 70 Mg PO FRI





Instructions to patient/family


Please see electronic discharge instructions given to patient.





Diagnosis/Problems


Diagnosis/Problems





(1) Altered mental status


Status:  Acute


Qualifiers:  


   Qualified Codes:  R41.82 - Altered mental status, unspecified


(2) Sepsis


(3) UTI (urinary tract infection)


Status:  Acute


Qualifiers:  


   Qualified Codes:  N39.0 - Urinary tract infection, site not specified


(4) Fall on same level


Status:  Acute


Qualifiers:  


   Qualified Codes:  W18.30XA - Fall on same level, unspecified, initial 

encounter











BRITTANY LINDSEY DO                Feb 25, 2023 11:31

## 2023-02-25 NOTE — DISCHARGE INST-SKILLED NURSING
Discharge Inst-Skilled NF


Reconcile Patient Problems


Problems Reviewed?:  Yes





Chief Complaint


CC: AMS





HPI: This is an 82yoWF NHP at [facility] who presents to the ER after

found down at NH today by nursing staff. Patient had entire w/u but revealed no 

evidence of any type of fracture but she appears to be very confused and unable 

to carry on a conversation which is definitely not her baseline. Currently she 

is tracking my movements but really unable to converse well.





Patient Instructions


Patient Problems:  


Debility


Goal:  


Charles Mix





Consult/Follow Up/Orders


Follow Up Appt.:  


Dr Machuca/Veena Barboza NH rounds


Skilled NF Admit to:  [facility]


Certification (SNF)


I certify that SNF services are required to be given on an inpatient basis 

because of the above named patient's need for skilled nursing care on a 

continuing basis for the conditions(s) for which he/she was receiving inpatient 

hospital services prior to his/her transfer to the SNF.


Skilled Nursing Facility Order:  Nursing Services, Occupational Ther-Evaluate & 

Treat, Physical Therapy-Evaluate & Treat


Oxygen Delivery Method:  Nasal Cannula





New & Resume Previous Orders


New Medications:  


Cefdinir (Cefdinir) 300 Mg Capsule


300 MG PO BID, #10 CAP





 


Continued Medications:  


Acetaminophen (Tylenol Arthritis) 650 Mg Tablet.er


650 MG PO Q4H PRN for PAIN-MILD (1-4), TAB





Alendronate Sodium (Alendronate Sodium) 70 Mg Tablet


70 MG PO FRI, TAB





Aspirin (Aspirin EC) 81 Mg Tablet.dr


81 MG PO DAILY PRN, TAB





Atorvastatin Calcium (Atorvastatin Calcium) 40 Mg Tablet


40 MG PO DAILY, TAB





Calcium Carbonate/Vitamin D3 (Calcium 600 + Vit D 200 Tablet) 600 Mg Calcium-5 

Mcg (200 Unit) Tablet


1 EACH PO DAILY, TAB





Carboxymethylcellulose Sodium (Lubricant Eye Drops) 0.5 % Drops


1 DROP OU QID, DROPS





Cholecalciferol (Vitamin D3) (Vitamin D3) 50 Mcg (2000 Unit) Capsule


50 MCG PO DAILY, CAP





Divalproex Sodium (Divalproex Sodium) 500 Mg Tablet.dr


1500 MG PO HS, TAB


TAKES 3 (500MG) TABS


Ferrous Sulfate (Ferosul) 325 Mg (65 Mg Iron) Tablet


325 MG PO DAILY, TAB





Fluticasone Propionate (Flonase Allergy Relief) 50 Mcg/Actuation Spray.susp


1 SPRAY NSEACH BID, EACH





Furosemide (Furosemide) 40 Mg Tablet


40 MG PO DAILY, TAB





Haloperidol Lactate (Haloperidol Lactate) 2 Mg/Ml Oral.conc


0.5 ML PO BID, ML


MAY PUT IN 6OZ OF ANY PREFERRED BEVERAGE


L. Rhamnosus GG/Inulin (Probiotic Digest Supp 20B Cfu) 20 Billion Cell-200 Mg 

Capsule


1 EACH PO DAILY, CAP





Latanoprost (Xalatan) 0.005 % Drops


1 DROP OU HS, EA





Levothyroxine Sodium (Levothyroxine Sodium) 112 Mcg Tablet


112 MCG PO DAILY, TAB





Losartan Potassium (Losartan Potassium) 100 Mg Tablet


100 MG PO DAILY, TAB


HOLD FOR SBP <100 OR PULSE <60- NOTIFY NURSE IF MEDICATION IS HELD


 NOTIFY PCP IF HELD FOR 3 CONSECUTIVE DAYS


Lutein (Lutein) 20 Mg Tablet


20 MG PO DAILY, TAB





Magnesium Hydroxide (Milk of Magnesia) 400 Mg/5 Ml Oral.susp


30 ML PO DAILY PRN for CONSTIPATION-7TH LINE, ML





Metoprolol Succinate (Metoprolol Succinate) 25 Mg Tab.er.24h


25 MG PO DAILY, TAB


HOLD FOR PULSE <60 NOTIFY NURSE IF HELD


Multivitamin with Minerals (One Daily Plus Minerals) 1 Each Tablet


1 EACH PO DAILY, TAB





Nicotine (Nicotine Patch) 21 Mg/24 Hour Patch.td24


21 MG TD DAILY PRN for SMOKING CESSATION, PATCH





Omeprazole (Omeprazole) 40 Mg Capsule.dr


40 MG PO DAILY, CAP





Polyethylene Glycol 3350 (Miralax) 17 Gram Powd.pack


17 GM PO DAILY, EACH





Potassium Chloride (Klor-Con 10) 10 Meq Tablet.er


10 MEQ PO DAILY, TAB





Sodium Chloride (Ayr Saline) 0.65 % [place]


1 SPRAY NSEACH UD PRN for DRY NOSE, EA





Vitamin B Complex (Vitamin B Complex) 1 Each Tablet


1 EACH PO DAILY, TAB








Shahida Machuca 


Feb 25, 2023 


11:29











SHAHIDA MACHUCA DO                Feb 25, 2023 11:30

## 2023-02-25 NOTE — PHYSICAL THERAPY DAILY NOTE
PT Daily Note-Current


Subjective


Pt agreeable to ambulation.





Pain


Section J - Health Conditions


1. Rarely or not at all


2. Occasionally


3. Frequently


4. Almost constantly


8. Unable to answer


Pain Effect on Sleep:  1


Pain Interference with Therapy:  1


Pain Interference w/Day-to-Day:  1





Transfers


SCALE: Activities may be completed with or without assistive devices.





6-Indepedent-patient completes the activity by him/herself with no assistance 

from a helper.


5-Set-up or Clean-up Assistance-helper sets up or cleans up; patient completes 

activity. [place] assists only prior to or  


    following the activity.


4-Supervision or Touching Assistance-helper provides verbal cues and/or 

touching/steadying and/or contact guard assistance as patient completes 

activity. Assistance may be provided   


    throughout the activity or intermittently.


3-Partial/Moderate Assistance-helper does LESS THAN HALF the effort. Sutton 

lifts, holds or supports trunk or limbs, but provides less than half the effort.


2-Substantial/Maximal Assistance-helper does MORE THAN HALF the effort. [place] 

lifts or holds trunk or limbs and provides more than half the effort.


9-Ghrraomhd-atybkc does ALL the effort. Patient does none of the effort to 

complete the activity. Or, the assistance of 2 or more helpers is required for 

the patient to complete the  


    activity.


If activity was not attempted, code reason:


7-Patient Refused.


9-Not Applicable-not attempted and the patient did not perform the activity 

before the current illness, exacerbation or injury.


10-Not Attempted due to Environmental Limitations-(lack of equipment, weather 

restraints, etc.).


88-Not Attempted due to Medical Conditions or Safety Concerns.


Sit to Stand (QC):  4





Gait Training


Gait Persons Needed:  1


Gait Assistive Device:  FWW


Ambulate 125ft with FWW and SBA with O2 at 2 L/min





PT Long Term Goals


Long Term Goals


PT Long Term Goals Time Frame:  Mar 11, 2023


Roll Left & Right (QC):  4


Sit to Lying (QC):  4


Lying-Sitting on Side/Bed(QC):  4


Sit to Stand (QC):  4


Chair/Bed-to-Chair Xfer(QC):  4


Toilet Transfer (QC):  4


Walk 10 feet (QC):  4


Walk 50ft with 2 Turns (QC):  4





PT Plan


Treatment/Plan


Treatment Plan:  Continue Plan of Care


Treatment Plan:  Bed Mobility, Education, Functional Activity Jared, Functional 

Strength, Gait, Safety, Therapeutic Exercise, Transfers


Treatment Duration:  Mar 11, 2023


Frequency:  6 times per week


Estimated Hrs Per Day:  .25 hour per day


Patient and/or Family Agrees t:  Yes





Time


Time In:  1100


Time Out:  1115


DATE:  Feb 25, 2023


Total Billed Treatment Time:  15


Total Billed Treatment


visit, Gait 15 min











ARDEN SIMONS PT                Feb 25, 2023 12:21

## 2023-03-13 ENCOUNTER — HOSPITAL ENCOUNTER (OUTPATIENT)
Dept: HOSPITAL 75 - LABNPT | Age: 83
End: 2023-03-13
Attending: NURSE PRACTITIONER
Payer: MEDICARE

## 2023-03-13 DIAGNOSIS — R41.82: Primary | ICD-10-CM

## 2023-03-13 LAB
AMORPH SED URNS QL MICRO: (no result) /LPF
APTT PPP: YELLOW S
BACTERIA #/AREA URNS HPF: (no result) /HPF
BILIRUB UR QL STRIP: NEGATIVE
FIBRINOGEN PPP-MCNC: (no result) MG/DL
GLUCOSE UR STRIP-MCNC: NEGATIVE MG/DL
KETONES UR QL STRIP: NEGATIVE
LEUKOCYTE ESTERASE UR QL STRIP: (no result)
NITRITE UR QL STRIP: POSITIVE
PH UR STRIP: 6 [PH] (ref 5–9)
PROT UR QL STRIP: (no result)
RBC #/AREA URNS HPF: (no result) /HPF
SP GR UR STRIP: 1.01 (ref 1.02–1.02)
SQUAMOUS #/AREA URNS HPF: (no result) /HPF
WBC #/AREA URNS HPF: >100 /HPF

## 2023-03-13 PROCEDURE — 81000 URINALYSIS NONAUTO W/SCOPE: CPT

## 2023-03-13 PROCEDURE — 87088 URINE BACTERIA CULTURE: CPT

## 2023-03-14 ENCOUNTER — HOSPITAL ENCOUNTER (INPATIENT)
Dept: HOSPITAL 75 - ER | Age: 83
LOS: 3 days | Discharge: NURSING, REHAB OR LONG-TERM CARE | DRG: 689 | End: 2023-03-17
Attending: INTERNAL MEDICINE | Admitting: INTERNAL MEDICINE
Payer: MEDICARE

## 2023-03-14 VITALS — DIASTOLIC BLOOD PRESSURE: 94 MMHG | SYSTOLIC BLOOD PRESSURE: 161 MMHG

## 2023-03-14 VITALS — BODY MASS INDEX: 35.43 KG/M2 | WEIGHT: 199.96 LBS | HEIGHT: 62.99 IN

## 2023-03-14 VITALS — SYSTOLIC BLOOD PRESSURE: 123 MMHG | DIASTOLIC BLOOD PRESSURE: 75 MMHG

## 2023-03-14 VITALS — SYSTOLIC BLOOD PRESSURE: 137 MMHG | DIASTOLIC BLOOD PRESSURE: 72 MMHG

## 2023-03-14 DIAGNOSIS — B96.20: ICD-10-CM

## 2023-03-14 DIAGNOSIS — Z79.82: ICD-10-CM

## 2023-03-14 DIAGNOSIS — F03.90: ICD-10-CM

## 2023-03-14 DIAGNOSIS — F25.9: ICD-10-CM

## 2023-03-14 DIAGNOSIS — Z87.891: ICD-10-CM

## 2023-03-14 DIAGNOSIS — R73.9: ICD-10-CM

## 2023-03-14 DIAGNOSIS — N39.0: Primary | ICD-10-CM

## 2023-03-14 DIAGNOSIS — K57.90: ICD-10-CM

## 2023-03-14 DIAGNOSIS — Z20.822: ICD-10-CM

## 2023-03-14 DIAGNOSIS — E78.00: ICD-10-CM

## 2023-03-14 DIAGNOSIS — F31.9: ICD-10-CM

## 2023-03-14 DIAGNOSIS — E03.9: ICD-10-CM

## 2023-03-14 DIAGNOSIS — K21.9: ICD-10-CM

## 2023-03-14 DIAGNOSIS — K57.92: ICD-10-CM

## 2023-03-14 DIAGNOSIS — Z79.899: ICD-10-CM

## 2023-03-14 DIAGNOSIS — D64.9: ICD-10-CM

## 2023-03-14 DIAGNOSIS — G93.41: ICD-10-CM

## 2023-03-14 DIAGNOSIS — I10: ICD-10-CM

## 2023-03-14 LAB
ALBUMIN SERPL-MCNC: 3.4 GM/DL (ref 3.2–4.5)
ALP SERPL-CCNC: 59 U/L (ref 40–136)
ALT SERPL-CCNC: 7 U/L (ref 0–55)
AMYLASE SERPL-CCNC: 30 U/L (ref 25–125)
APTT BLD: 31 SEC (ref 24–35)
APTT PPP: YELLOW S
BACTERIA #/AREA URNS HPF: (no result) /HPF
BASOPHILS # BLD AUTO: 0 10^3/UL (ref 0–0.1)
BASOPHILS NFR BLD AUTO: 0 % (ref 0–10)
BILIRUB SERPL-MCNC: 0.3 MG/DL (ref 0.1–1)
BILIRUB UR QL STRIP: NEGATIVE
BUN/CREAT SERPL: 19
CALCIUM SERPL-MCNC: 9.3 MG/DL (ref 8.5–10.1)
CHLORIDE SERPL-SCNC: 99 MMOL/L (ref 98–107)
CO2 SERPL-SCNC: 26 MMOL/L (ref 21–32)
CREAT SERPL-MCNC: 0.8 MG/DL (ref 0.6–1.3)
EOSINOPHIL # BLD AUTO: 0 10^3/UL (ref 0–0.3)
EOSINOPHIL NFR BLD AUTO: 0 % (ref 0–10)
ERYTHROCYTE [SEDIMENTATION RATE] IN BLOOD: 58 MM/HR (ref 0–30)
FIBRINOGEN PPP-MCNC: (no result) MG/DL
GFR SERPLBLD BASED ON 1.73 SQ M-ARVRAT: 74 ML/MIN
GLUCOSE SERPL-MCNC: 205 MG/DL (ref 70–105)
GLUCOSE UR STRIP-MCNC: NEGATIVE MG/DL
HCT VFR BLD CALC: 39 % (ref 35–52)
HGB BLD-MCNC: 12.8 G/DL (ref 11.5–16)
INR PPP: 1 (ref 0.8–1.4)
KETONES UR QL STRIP: NEGATIVE
LEUKOCYTE ESTERASE UR QL STRIP: (no result)
LIPASE SERPL-CCNC: 27 U/L (ref 8–78)
LYMPHOCYTES # BLD AUTO: 0.6 10^3/UL (ref 1–4)
LYMPHOCYTES NFR BLD AUTO: 7 % (ref 12–44)
MAGNESIUM SERPL-MCNC: 1.8 MG/DL (ref 1.6–2.4)
MANUAL DIFFERENTIAL PERFORMED BLD QL: YES
MCH RBC QN AUTO: 32 PG (ref 25–34)
MCHC RBC AUTO-ENTMCNC: 33 G/DL (ref 32–36)
MCV RBC AUTO: 98 FL (ref 80–99)
MONOCYTES # BLD AUTO: 0.9 10^3/UL (ref 0–1)
MONOCYTES NFR BLD AUTO: 10 % (ref 0–12)
MONOCYTES NFR BLD: 10 %
NEUTROPHILS # BLD AUTO: 7.1 10^3/UL (ref 1.8–7.8)
NEUTROPHILS NFR BLD AUTO: 82 % (ref 42–75)
NEUTS BAND NFR BLD MANUAL: 79 %
NITRITE UR QL STRIP: POSITIVE
PH UR STRIP: 7 [PH] (ref 5–9)
PLATELET # BLD: 211 10^3/UL (ref 130–400)
PMV BLD AUTO: 9.7 FL (ref 9–12.2)
POTASSIUM SERPL-SCNC: 3.5 MMOL/L (ref 3.6–5)
PROT SERPL-MCNC: 7.3 GM/DL (ref 6.4–8.2)
PROT UR QL STRIP: (no result)
PROTHROMBIN TIME: 13.7 SEC (ref 12.2–14.7)
RBC #/AREA URNS HPF: (no result) /HPF
RBC MORPH BLD: NORMAL
SODIUM SERPL-SCNC: 137 MMOL/L (ref 135–145)
SP GR UR STRIP: 1.01 (ref 1.02–1.02)
SQUAMOUS #/AREA URNS HPF: (no result) /HPF
TSH SERPL DL<=0.05 MIU/L-ACNC: 4.39 UIU/ML (ref 0.35–4.94)
VALPROATE SERPL-MCNC: 35.4 UG/ML (ref 50–100)
VARIANT LYMPHS NFR BLD MANUAL: 11 %
WBC # BLD AUTO: 8.7 10^3/UL (ref 4.3–11)
WBC #/AREA URNS HPF: (no result) /HPF

## 2023-03-14 PROCEDURE — 87636 SARSCOV2 & INF A&B AMP PRB: CPT

## 2023-03-14 PROCEDURE — 85007 BL SMEAR W/DIFF WBC COUNT: CPT

## 2023-03-14 PROCEDURE — 87077 CULTURE AEROBIC IDENTIFY: CPT

## 2023-03-14 PROCEDURE — 83735 ASSAY OF MAGNESIUM: CPT

## 2023-03-14 PROCEDURE — 81000 URINALYSIS NONAUTO W/SCOPE: CPT

## 2023-03-14 PROCEDURE — 83036 HEMOGLOBIN GLYCOSYLATED A1C: CPT

## 2023-03-14 PROCEDURE — 85027 COMPLETE CBC AUTOMATED: CPT

## 2023-03-14 PROCEDURE — 85610 PROTHROMBIN TIME: CPT

## 2023-03-14 PROCEDURE — 84443 ASSAY THYROID STIM HORMONE: CPT

## 2023-03-14 PROCEDURE — 70450 CT HEAD/BRAIN W/O DYE: CPT

## 2023-03-14 PROCEDURE — 85730 THROMBOPLASTIN TIME PARTIAL: CPT

## 2023-03-14 PROCEDURE — 86141 C-REACTIVE PROTEIN HS: CPT

## 2023-03-14 PROCEDURE — 82150 ASSAY OF AMYLASE: CPT

## 2023-03-14 PROCEDURE — 87088 URINE BACTERIA CULTURE: CPT

## 2023-03-14 PROCEDURE — 36415 COLL VENOUS BLD VENIPUNCTURE: CPT

## 2023-03-14 PROCEDURE — 85652 RBC SED RATE AUTOMATED: CPT

## 2023-03-14 PROCEDURE — 83690 ASSAY OF LIPASE: CPT

## 2023-03-14 PROCEDURE — 74176 CT ABD & PELVIS W/O CONTRAST: CPT

## 2023-03-14 PROCEDURE — 80164 ASSAY DIPROPYLACETIC ACD TOT: CPT

## 2023-03-14 PROCEDURE — 82947 ASSAY GLUCOSE BLOOD QUANT: CPT

## 2023-03-14 PROCEDURE — 87040 BLOOD CULTURE FOR BACTERIA: CPT

## 2023-03-14 PROCEDURE — 80053 COMPREHEN METABOLIC PANEL: CPT

## 2023-03-14 PROCEDURE — 71045 X-RAY EXAM CHEST 1 VIEW: CPT

## 2023-03-14 PROCEDURE — 83605 ASSAY OF LACTIC ACID: CPT

## 2023-03-14 PROCEDURE — 71250 CT THORAX DX C-: CPT

## 2023-03-14 PROCEDURE — 51702 INSERT TEMP BLADDER CATH: CPT

## 2023-03-14 PROCEDURE — 93005 ELECTROCARDIOGRAM TRACING: CPT

## 2023-03-14 PROCEDURE — 85025 COMPLETE CBC W/AUTO DIFF WBC: CPT

## 2023-03-14 PROCEDURE — 87186 SC STD MICRODIL/AGAR DIL: CPT

## 2023-03-14 PROCEDURE — 82010 KETONE BODYS QUAN: CPT

## 2023-03-14 RX ADMIN — ENOXAPARIN SODIUM SCH MG: 100 INJECTION SUBCUTANEOUS at 22:10

## 2023-03-14 NOTE — DIAGNOSTIC IMAGING REPORT
PROCEDURE: CT head wo r/o stroke.



TECHNIQUE: Multiple contiguous axial images were obtained through

the brain without the use of intravenous contrast. Auto Exposure

Controls were utilized during the CT exam to meet ALARA standards

for radiation dose reduction. 



INDICATION: Neuro deficit, altered mental status and decreased

appetite.



COMPARISON: CT head from 02/22/2023.



FINDINGS: No intracranial hyperdense hemorrhage or

space-occupying mass. No hydrocephalus or midline shift.

Gray-white matter differentiation is well-preserved.

Age-appropriate global atrophy is unchanged. Periventricular

hypoattenuation is similar and due to chronic microvascular

ischemic change. No hyperdense vessel sign. No acute calvarial

abnormality.



IMPRESSION:

1. No acute intracranial hemorrhage or features of large

territorial infarct.

2. Stable global atrophy with mild chronic microvascular ischemic

change.



Dictated by: 



  Dictated on workstation # [id number]

## 2023-03-14 NOTE — DIAGNOSTIC IMAGING REPORT
CHEST 1 VIEW, AP/PA ONLY



Indication: Fever



Comparison: 02/22/2023



Findings:

Stable chronic blunting the left costophrenic angle is due to a

large amount of mediastinal fat. No consolidations have developed

in the visible lungs. No pleural effusion or pneumothorax. Heart

remains upper limits of normal in size. Unchanged large hiatal

hernia.



Impression: 

1. No acute cardiopulmonary process by portable radiography.



Dictated by: 



  Dictated on workstation # [id number]

## 2023-03-14 NOTE — ED GENERAL
General


Chief Complaint:  Altered Mental Status


Stated Complaint:  AMS


Nursing Triage Note:  


PT TO ED BY EMS FROM [facility] WITH C/O AMS.  NURSING HOME NURSE 


REPORTS PT FINISHED CIPRO FOR UTI SEPSIS 2 DAYS AGO, SLOW TO RESPOND TODAY, 


DECREASED APPETITE, AND PT WOULD NOT PUT HER BRIEF ON. REPORTS LOW GRADE FEVER 


100.2. PT HAS HX DEMENTIA. DENIES PAIN AT THIS TIME.





Allergies and Home Medications


Allergies


Coded Allergies:  


     codeine (Verified  Allergy, Unknown, 2/22/23)


     lurasidone (Verified  Allergy, Unknown, 2/22/23)


     quetiapine (Verified  Allergy, Unknown, 2/22/23)


     trifluoperazine (Verified  Allergy, Unknown, 2/22/23)





Patient Home Medication List


Acetaminophen (Tylenol Arthritis) 650 Mg Tablet.er, 650 MG PO Q4H PRN for PAIN-

MILD (1-4), (Reported)


   Entered as Reported by: DAVID SAM on 2/23/23 1341


Alendronate Sodium (Alendronate Sodium) 70 Mg Tablet, 70 MG PO FRI, (Reported)


   Entered as Reported by: DAVID SAM on 2/23/23 1341


Aspirin (Aspirin EC) 81 Mg Tablet.dr, 81 MG PO DAILY PRN, (Reported)


   Entered as Reported by: DAVID SAM on 2/23/23 1341


Atorvastatin Calcium (Atorvastatin Calcium) 40 Mg Tablet, 40 MG PO DAILY, 

(Reported)


   Entered as Reported by: DAVID SAM on 2/23/23 1341


Calcium Carbonate/Vitamin D3 (Calcium 600 + Vit D 200 Tablet) 600 Mg Calcium-5 

Mcg (200 Unit) Tablet, 1 EACH PO DAILY, (Reported)


   Entered as Reported by: DAVID SAM on 2/23/23 1341


Carboxymethylcellulose Sodium (Lubricant Eye Drops) 0.5 % Drops, 1 DROP OU QID, 

(Reported)


   Entered as Reported by: DAVID SAM on 2/23/23 1341


Cefdinir (Cefdinir) 300 Mg Capsule, 300 MG PO BID


   Prescribed by: BRITTANY LINDSEY on 2/25/23 1129


Cholecalciferol (Vitamin D3) (Vitamin D3) 50 Mcg (2000 Unit) Capsule, 50 MCG PO 

DAILY, (Reported)


   Entered as Reported by: DAVID SAM on 2/23/23 1341


Divalproex Sodium (Divalproex Sodium) 500 Mg Tablet.dr, 1,500 MG PO HS, 

(Reported)


   Entered as Reported by: DAVID SAM on 2/23/23 1341


Ferrous Sulfate (Ferosul) 325 Mg (65 Mg Iron) Tablet, 325 MG PO DAILY, 

(Reported)


   Entered as Reported by: DAVID SAM on 2/23/23 1341


Fluticasone Propionate (Flonase Allergy Relief) 50 Mcg/Actuation Jefferson.susp, 1 

SPRAY NSEACH BID, (Reported)


   Entered as Reported by: DAVID SAM on 2/23/23 1341


Furosemide (Furosemide) 40 Mg Tablet, 40 MG PO DAILY, (Reported)


   Entered as Reported by: DAVID SAM on 2/23/23 1341


Haloperidol Lactate (Haloperidol Lactate) 2 Mg/Ml Oral.conc, 0.5 ML PO BID, 

(Reported)


   Entered as Reported by: DAVID SAM on 2/23/23 1341


L. Rhamnosus GG/Inulin (Probiotic Digest Supp 20B Cfu) 20 Billion Cell-200 Mg 

Capsule, 1 EACH PO DAILY, (Reported)


   Entered as Reported by: DAVID SAM on 2/23/23 1341


Latanoprost (Xalatan) 0.005 % Drops, 1 DROP OU HS, (Reported)


   Entered as Reported by: DAVID SAM on 2/23/23 1341


Levothyroxine Sodium (Levothyroxine Sodium) 112 Mcg Tablet, 112 MCG PO DAILY, 

(Reported)


   Entered as Reported by: DAVID SAM on 2/23/23 1341


Losartan Potassium (Losartan Potassium) 100 Mg Tablet, 100 MG PO DAILY, 

(Reported)


   Entered as Reported by: DAVID SAM on 2/23/23 1341


Lutein (Lutein) 20 Mg Tablet, 20 MG PO DAILY, (Reported)


   Entered as Reported by: DAVID SAM on 2/23/23 1341


Magnesium Hydroxide (Milk of Magnesia) 400 Mg/5 Ml Oral.susp, 30 ML PO DAILY PRN

for CONSTIPATION-7TH LINE, (Reported)


   Entered as Reported by: DAVID SAM on 2/23/23 1341


Metoprolol Succinate (Metoprolol Succinate) 25 Mg Tab.er.24h, 25 MG PO DAILY, 

(Reported)


   Entered as Reported by: DAVID SAM on 2/23/23 1341


Multivitamin with Minerals (One Daily Plus Minerals) 1 Each Tablet, 1 EACH PO 

DAILY, (Reported)


   Entered as Reported by: DAVID SAM on 2/23/23 1341


Nicotine (Nicotine Patch) 21 Mg/24 Hour Patch.td24, 21 MG TD DAILY PRN for 

SMOKING CESSATION, (Reported)


   Entered as Reported by: DAVID SAM on 2/23/23 1341


Omeprazole (Omeprazole) 40 Mg Capsule.dr, 40 MG PO DAILY, (Reported)


   Entered as Reported by: DAVID SAM on 2/23/23 1341


Polyethylene Glycol 3350 (Miralax) 17 Gram Powd.pack, 17 GM PO DAILY, (Reported)


   Entered as Reported by: DAVID SAM on 2/23/23 1341


Potassium Chloride (Klor-Con 10) 10 Meq Tablet.er, 10 MEQ PO DAILY, (Reported)


   Entered as Reported by: DAVID SMA on 2/23/23 1341


Sodium Chloride (Ayr Saline) 0.65 % Jefferson, 1 SPRAY NSEACH UD PRN for DRY NOSE, 

(Reported)


   Entered as Reported by: DAVID SAM on 2/23/23 1341


Vitamin B Complex (Vitamin B Complex) 1 Each Tablet, 1 EACH PO DAILY, (Reported)


   Entered as Reported by: DAVID SAM on 2/23/23 1341





Past Medical-Social-Family Hx


Past Medical History


Surgeries:  No (Unknown)


Respiratory:  Yes


Sleep Apnea


Currently Using CPAP:  No


Currently Using BIPAP:  No


Cardiac:  Yes (history of heart failure)


High Cholesterol, Hypertension


Neurological:  Yes


Dementia


Genitourinary:  Yes (History of UTI)


Bladder Infection


Gastrointestinal:  Yes


Gastroesophageal Reflux


Musculoskeletal:  No


Endocrine:  Yes


Hypothyroidsim


HEENT:  No


Cancer:  No


Psychosocial:  Yes (Schizoaffective disorder)


Depression


Blood Disorders:  Yes (Anemia)





Physical Exam


Vital Signs





Vital Signs - First Documented








 3/14/23





 18:23


 


Temp 38.1


 


Pulse 74


 


Resp 18


 


B/P (MAP) 161/94 (116)


 


Pulse Ox 93


 


O2 Delivery Room Air





Capillary Refill : Less Than 3 Seconds


Height, Weight, BMI


Height: '"


Weight: lbs. oz. kg; 35.00 BMI


Method:





Focused Exam


Sepsis Stage:  Sepsis


Possible Source:  Genitouriary


Lactate Level


3/14/23 18:30: Lactic Acid Level 2.89*H





Time of Focused Exam:  19:45


Respiratory:  Normal Breath Sounds, No Accessory Muscle Use, No Respiratory 

Distress


Cardiovascular:  Regular Rate, Rhythm (WITH PAC'S)


Capillary Refill:  Less Than 3 Seconds


Lactic Acid Level





Laboratory Tests








Test


 3/14/23


18:30


 


Lactic Acid Level


 2.89 MMOL/L


(0.50-2.00)  *H








Within 3hrs of presentation:  Admin fluids, Admin ABX, Blood cultures prior to 

ABX's, Focus exam, Lactate level





Progress/Results/Core Measures


Suspected Sepsis


SIRS


Temperature: 


Pulse: 74 


Respiratory Rate: 18


 


Laboratory Tests


3/14/23 18:30: White Blood Count 8.7


Blood Pressure 161 /94 


Mean: 116


 


3/14/23 18:30: Lactic Acid Level 2.89*H


Laboratory Tests


3/14/23 18:30: 


Creatinine 0.80, INR Comment 1.0, Platelet Count 211, Total Bilirubin 0.3








Results/Orders


Lab Results





Laboratory Tests








Test


 3/14/23


18:24 3/14/23


18:30 3/14/23


19:01 Range/Units


 


 


Influenza Type A (RT-PCR) Not Detected    Not Detecte  


 


Influenza Type B (RT-PCR) Not Detected    Not Detecte  


 


SARS-CoV-2 RNA (RT-PCR) Not Detected    Not Detecte  


 


White Blood Count


 


 8.7 


 


 4.3-11.0


10^3/uL


 


Red Blood Count


 


 4.01 


 


 3.80-5.11


10^6/uL


 


Hemoglobin  12.8   11.5-16.0  g/dL


 


Hematocrit  39   35-52  %


 


Mean Corpuscular Volume  98   80-99  fL


 


Mean Corpuscular Hemoglobin  32   25-34  pg


 


Mean Corpuscular Hemoglobin


Concent 


 33 


 


 32-36  g/dL





 


Red Cell Distribution Width  12.5   10.0-14.5  %


 


Platelet Count


 


 211 


 


 130-400


10^3/uL


 


Mean Platelet Volume  9.7   9.0-12.2  fL


 


Immature Granulocyte % (Auto)  1    %


 


Neutrophils (%) (Auto)  82 H  42-75  %


 


Lymphocytes (%) (Auto)  7 L  12-44  %


 


Monocytes (%) (Auto)  10   0-12  %


 


Eosinophils (%) (Auto)  0   0-10  %


 


Basophils (%) (Auto)  0   0-10  %


 


Neutrophils # (Auto)


 


 7.1 


 


 1.8-7.8


10^3/uL


 


Lymphocytes # (Auto)


 


 0.6 L


 


 1.0-4.0


10^3/uL


 


Monocytes # (Auto)


 


 0.9 


 


 0.0-1.0


10^3/uL


 


Eosinophils # (Auto)


 


 0.0 


 


 0.0-0.3


10^3/uL


 


Basophils # (Auto)


 


 0.0 


 


 0.0-0.1


10^3/uL


 


Immature Granulocyte # (Auto)


 


 0.1 


 


 0.0-0.1


10^3/uL


 


Neutrophils % (Manual)  79    %


 


Lymphocytes % (Manual)  11    %


 


Monocytes % (Manual)  10    %


 


Blood Morphology Comment  NORMAL    


 


Erythrocyte Sedimentation Rate  58 H  0-30  MM/HR


 


Prothrombin Time  13.7   12.2-14.7  SEC


 


INR Comment  1.0   0.8-1.4  


 


Activated Partial


Thromboplast Time 


 31 


 


 24-35  SEC





 


Sodium Level  137   135-145  MMOL/L


 


Potassium Level  3.5 L  3.6-5.0  MMOL/L


 


Chloride Level  99     MMOL/L


 


Carbon Dioxide Level  26   21-32  MMOL/L


 


Anion Gap  12   5-14  MMOL/L


 


Blood Urea Nitrogen  15   7-18  MG/DL


 


Creatinine


 


 0.80 


 


 0.60-1.30


MG/DL


 


Estimat Glomerular Filtration


Rate 


 74 


 


  





 


BUN/Creatinine Ratio  19    


 


Glucose Level  205 H    MG/DL


 


Lactic Acid Level


 


 2.89 *H


 


 0.50-2.00


MMOL/L


 


Calcium Level  9.3   8.5-10.1  MG/DL


 


Corrected Calcium  9.8   8.5-10.1  MG/DL


 


Magnesium Level  1.8   1.6-2.4  MG/DL


 


Total Bilirubin  0.3   0.1-1.0  MG/DL


 


Aspartate Amino Transf


(AST/SGOT) 


 8 


 


 5-34  U/L





 


Alanine Aminotransferase


(ALT/SGPT) 


 7 


 


 0-55  U/L





 


Alkaline Phosphatase  59     U/L


 


C-Reactive Protein High


Sensitivity 


 4.42 H


 


 0.00-0.50


MG/DL


 


Total Protein  7.3   6.4-8.2  GM/DL


 


Albumin  3.4   3.2-4.5  GM/DL


 


Amylase Level  30     U/L


 


Lipase  27   8-78  U/L


 


TSH Gulf Testing


 


 4.39 


 


 0.35-4.94


UIU/ML


 


Valproic Acid (Depakene) Level


 


 35.4 L


 


 50.0-100.0


UG/ML


 


Urine Color   YELLOW   


 


Urine Clarity   SL CLOUDY   


 


Urine pH   7.0  5-9  


 


Urine Specific Gravity   1.010 L 1.016-1.022  


 


Urine Protein   1+ H NEGATIVE  


 


Urine Glucose (UA)   NEGATIVE  NEGATIVE  


 


Urine Ketones   NEGATIVE  NEGATIVE  


 


Urine Nitrite   POSITIVE H NEGATIVE  


 


Urine Bilirubin   NEGATIVE  NEGATIVE  


 


Urine Urobilinogen   1.0  < = 1.0  MG/DL


 


Urine Leukocyte Esterase   2+ H NEGATIVE  


 


Urine RBC (Auto)   1+ H NEGATIVE  


 


Urine RBC   5-10 H  /HPF


 


Urine WBC   TNTC H  /HPF


 


Urine Squamous Epithelial


Cells 


 


 0-2 


  /HPF





 


Urine Crystals   NONE   /LPF


 


Urine Bacteria   LARGE H  /HPF


 


Urine Casts   NONE   /LPF


 


Urine Mucus   NEGATIVE   /LPF


 


Urine Culture Indicated


 


 


 CULTURE


PENDING  











My Orders





Orders - CHARLOTTE RICHARDSON DO


Ed Iv/Invasive Line Start (3/14/23 18:25)


Ekg Tracing (3/14/23 18:25)


Catheter(Urinary) Insert & Ass 03,15 (3/14/23 18:25)


O2 (3/14/23 18:25)


Monitor-Rhythm Ecg Trace Only (3/14/23 18:25)


Chest 1 View, Ap/Pa Only (3/14/23 18:25)


Magnesium (3/14/23 18:25)


Covid 19 Inhouse Test (3/14/23 18:25)


Cbc With Automated Diff (3/14/23 18:25)


Comprehensive Metabolic Panel (3/14/23 18:25)


Blood Culture (3/14/23 18:25)


Urinalysis (3/14/23 18:25)


Urine Culture (3/14/23 18:25)


Protime With Inr (3/14/23 18:25)


Partial Thromboplastin Time (3/14/23 18:25)


Acetaminophen  Tablet (Tylenol  Tablet) (3/14/23 18:30)


Vital Signs Adult Sepsis Patie Q15M (3/14/23 18:25)


Ondansetron Injection (Zofran Injectio (3/14/23 18:30)


O2 (3/14/23 18:25)


Remove Rings In Anticipation O (3/14/23 18:25)


Lactic Acid Analyzer (3/14/23 18:25)


Lactated Ringers (Lr 1000 Ml Iv Solution (3/14/23 18:30)


Cefepime Injection (Maxipime Injection) (3/14/23 18:30)


Lidocaine 2% (Urojet) (Xylocaine Urojet) (3/14/23 18:30)


Influenza A And B By Pcr (3/14/23 18:25)


Isolation Central Supply Req (3/14/23 18:25)


Amylase (3/14/23 18:25)


Hs C Reactive Protein (3/14/23 18:25)


Lipase (3/14/23 18:25)


Erythrocyte Sedimentation Rate (3/14/23 18:25)


Ed Iv/Invasive Line Start (3/14/23 18:25)


Ed Iv/Invasive Line Start (3/14/23 18:25)


Thyroid Analyzer (3/14/23 18:30)


Valproic Acid (3/14/23 18:30)


Ct Head Wo-R/O Stroke (3/14/23 18:36)


Ct Chest/Abdomen/Pelvis Wo (3/14/23 18:36)


Manual Differential (3/14/23 18:30)


Ekg Tracing (3/14/23 19:48)


Ed Admission (Communication) (3/14/23 20:01)





Medications Given in ED





Current Medications








 Medications  Dose


 Ordered  Sig/Yasmin


 Route  Start Time


 Stop Time Status Last Admin


Dose Admin


 


 Acetaminophen  1,000 mg  ONCE  PRN


 PO  3/14/23 18:30


 3/14/23 19:01 DC 3/14/23 19:00


1,000 MG


 


 Cefepime HCl 1000


 mg/Sodium Chloride  50 ml @ 


 100 mls/hr  ONCE  ONCE


 IV  3/14/23 18:30


 3/14/23 18:59 DC 3/14/23 19:00


100 MLS/HR


 


 Lactated Ringer's  1,000 ml @ 


 0 mls/hr  Q0M ONCE


 IV  3/14/23 18:30


 3/14/23 18:31 DC 3/14/23 19:00


0 MLS/HR


 


 Lidocaine HCl  10 ml  ONCE  ONCE


 TOP  3/14/23 18:30


 3/14/23 18:31 DC 3/14/23 19:00


10 ML


 


 Ondansetron HCl  4 mg  PRN  PRN


 IV  3/14/23 18:30


 3/14/23 19:01 DC 3/14/23 19:00


4 MG








Vital Signs/I&O











 3/14/23





 18:23


 


Temp 38.1


 


Pulse 74


 


Resp 18


 


B/P (MAP) 161/94 (116)


 


Pulse Ox 93


 


O2 Delivery Room Air





Capillary Refill : Less Than 3 Seconds








Blood Pressure Mean:                    116








Progress Note :  


Progress Note





SEPSIS PROTOCOL INITIATED





GIVEN:


-IV FLUIDS


-TYLENOL FOR FEVER


-ANTIBIOTICS





PT DID DROP DOWN INTO O2 SATS OF 80'S, WHILE PT WAS SLEEPING/RESTING


PLACED ON O2 AT 2L/NC AND O2 SATS REMAINED IN MID 90'S.





1944--PT WITH IMPROVED MENTATION, PT IS MUCH LESS LETHARGIC, AND SHE IS TALKING 

AND ANSWERING QUESTIONS--SHE IS ORIENTED TO PLACE--KNOWS SHE IS IN A HOSPITAL, 

AND KNOWS THAT DR. LINDSEY IS HER DR. 


SHE STATES "I KNEW SOMETHING WASN'T RIGHT" "I DIDN'T WANT TO EAT" "I JUST KNEW I

DIDN'T FEEL GOOD" 


SHE IS NOT ABLE TO ELABORATE FURTHER, AND SHE IS STILL CONFUSED TO SOME DEGREE /

MEMORY IMPAIRED--CONSISTENT WITH HISTORY OF DEMENTIA. SHE STILL IS UNABLE TO OR 

SIMPLY DOES NOT ANSWER MANY QUESTIONS, SUCH AS WHERE SHE LIVES OR WHERE SHE USED

TO LIVE, WHERE SHE IS ORIGINALLY FROM--SHE SIMPLY DOES NOT ANSWER QUESTION, 

WHICH SHE DOES WITH MANY OTHER QUESTIONS.





Diagnostic Imaging





Comments


CXR--PER RADIOLOGIST REPORT AT 1933


Comparison: 02/22/2023





Findings:


Stable chronic blunting the left costophrenic angle is due to a


large amount of mediastinal fat. No consolidations have developed


in the visible lungs. No pleural effusion or pneumothorax. Heart


remains upper limits of normal in size. Unchanged large hiatal


hernia.





Impression: 


1. No acute cardiopulmonary process by portable radiography.








CT HEAD--PER RADIOLOGIST REPORT AT 2016


COMPARISON: CT head from 02/22/2023.





FINDINGS: No intracranial hyperdense hemorrhage or


space-occupying mass. No hydrocephalus or midline shift.


Gray-white matter differentiation is well-preserved.


Age-appropriate global atrophy is unchanged. Periventricular


hypoattenuation is similar and due to chronic microvascular


ischemic change. No hyperdense vessel sign. No acute calvarial


abnormality.





IMPRESSION:


1. No acute intracranial hemorrhage or features of large


territorial infarct.


2. Stable global atrophy with mild chronic microvascular ischemic


change.





CT CHEST/ABDOMEN/PELVIS--PER RADIOLOGIST REPORT AT 2017


COMPARISON: 02/22/2023





FINDINGS:





CHEST: No abnormality in the trachea. The lungs are clear. No


pneumonia or edema. A few scattered areas of linear atelectasis


persists. No pleural effusion or pneumothorax. No supraclavicular


or axillary lymphadenopathy. No mediastinal or hilar


lymphadenopathy. Mildly dilated pulmonary trunk is unchanged. No


pericardial effusion. Large hiatal hernia is unchanged. Stable


dystrophic calcifications in the left breast. No worrisome focal


osseous lesions in the chest.





ABDOMEN AND PELVIS: No free intraperitoneal air has developed. No


loculated fluid collection. The liver has a stable simple cyst


present. No concerning abnormality. Cholelithiasis without


features of acute cholecystitis. The spleen and pancreas are


normal. No adrenal mass. No solid renal mass or obstructive


uropathy. Bilateral renal cysts are similar. Urinary bladder is


decompressed by Sotelo catheter. Diverticulosis is again noted


throughout the colon. In the mid descending colon, there is now


minimal stranding around the diverticulosis likely due to mild


diverticulitis. No bowel obstruction. Normal caliber abdominal


aorta. No abdominal or pelvic lymphadenopathy. No worrisome focal


osseous lesions.





IMPRESSION:


1. Mild diverticulitis in the mid descending colon is now noted.


2. No acute intrathoracic process.


   Reviewed:  Reviewed by Me





Departure


Impression





   Primary Impression:  


   Sepsis


   Additional Impressions:  


   UTI (urinary tract infection)


   Dementia


   Altered mental status


   Hyperglycemia


   MILD DIVERTICULITIS


   HTN (hypertension)





Departure-Patient Inst.


Referrals:  


BRITTANY LINDSEY DO (PCP)


Primary Care Physician











CHARLOTTE RICHARDSON DO                 Mar 14, 2023 19:35

## 2023-03-14 NOTE — DIAGNOSTIC IMAGING REPORT
PROCEDURE: CT chest, abdomen, and pelvis without contrast.



TECHNIQUE: Multiple contiguous axial images were obtained through

the chest, abdomen, and pelvis without the use of intravenous

contrast. Auto Exposure Controls were utilized during the CT exam

to meet ALARA standards for radiation dose reduction. 



INDICATION:  Fever, decreased appetite. Dementia and altered

mental status.



COMPARISON: 02/22/2023



FINDINGS:



CHEST: No abnormality in the trachea. The lungs are clear. No

pneumonia or edema. A few scattered areas of linear atelectasis

persists. No pleural effusion or pneumothorax. No supraclavicular

or axillary lymphadenopathy. No mediastinal or hilar

lymphadenopathy. Mildly dilated pulmonary trunk is unchanged. No

pericardial effusion. Large hiatal hernia is unchanged. Stable

dystrophic calcifications in the left breast. No worrisome focal

osseous lesions in the chest.



ABDOMEN AND PELVIS: No free intraperitoneal air has developed. No

loculated fluid collection. The liver has a stable simple cyst

present. No concerning abnormality. Cholelithiasis without

features of acute cholecystitis. The spleen and pancreas are

normal. No adrenal mass. No solid renal mass or obstructive

uropathy. Bilateral renal cysts are similar. Urinary bladder is

decompressed by Sotelo catheter. Diverticulosis is again noted

throughout the colon. In the mid descending colon, there is now

minimal stranding around the diverticulosis likely due to mild

diverticulitis. No bowel obstruction. Normal caliber abdominal

aorta. No abdominal or pelvic lymphadenopathy. No worrisome focal

osseous lesions.



IMPRESSION:

1. Mild diverticulitis in the mid descending colon is now noted.

2. No acute intrathoracic process.



Dictated by: 



  Dictated on workstation # [id number]

## 2023-03-15 VITALS — DIASTOLIC BLOOD PRESSURE: 68 MMHG | SYSTOLIC BLOOD PRESSURE: 120 MMHG

## 2023-03-15 VITALS — SYSTOLIC BLOOD PRESSURE: 136 MMHG | DIASTOLIC BLOOD PRESSURE: 66 MMHG

## 2023-03-15 VITALS — DIASTOLIC BLOOD PRESSURE: 60 MMHG | SYSTOLIC BLOOD PRESSURE: 132 MMHG

## 2023-03-15 VITALS — DIASTOLIC BLOOD PRESSURE: 52 MMHG | SYSTOLIC BLOOD PRESSURE: 105 MMHG

## 2023-03-15 VITALS — SYSTOLIC BLOOD PRESSURE: 120 MMHG | DIASTOLIC BLOOD PRESSURE: 62 MMHG

## 2023-03-15 VITALS — DIASTOLIC BLOOD PRESSURE: 81 MMHG | SYSTOLIC BLOOD PRESSURE: 141 MMHG

## 2023-03-15 LAB
ALBUMIN SERPL-MCNC: 2.9 GM/DL (ref 3.2–4.5)
ALP SERPL-CCNC: 48 U/L (ref 40–136)
ALT SERPL-CCNC: 7 U/L (ref 0–55)
BASOPHILS # BLD AUTO: 0 10^3/UL (ref 0–0.1)
BASOPHILS NFR BLD AUTO: 0 % (ref 0–10)
BILIRUB SERPL-MCNC: 0.2 MG/DL (ref 0.1–1)
BUN/CREAT SERPL: 19
CALCIUM SERPL-MCNC: 8.8 MG/DL (ref 8.5–10.1)
CHLORIDE SERPL-SCNC: 104 MMOL/L (ref 98–107)
CO2 SERPL-SCNC: 26 MMOL/L (ref 21–32)
CREAT SERPL-MCNC: 0.7 MG/DL (ref 0.6–1.3)
EOSINOPHIL # BLD AUTO: 0 10^3/UL (ref 0–0.3)
EOSINOPHIL NFR BLD AUTO: 0 % (ref 0–10)
GFR SERPLBLD BASED ON 1.73 SQ M-ARVRAT: 87 ML/MIN
GLUCOSE SERPL-MCNC: 93 MG/DL (ref 70–105)
HCT VFR BLD CALC: 37 % (ref 35–52)
HGB BLD-MCNC: 11.6 G/DL (ref 11.5–16)
LYMPHOCYTES # BLD AUTO: 0.6 10^3/UL (ref 1–4)
LYMPHOCYTES NFR BLD AUTO: 8 % (ref 12–44)
MANUAL DIFFERENTIAL PERFORMED BLD QL: NO
MCH RBC QN AUTO: 32 PG (ref 25–34)
MCHC RBC AUTO-ENTMCNC: 32 G/DL (ref 32–36)
MCV RBC AUTO: 100 FL (ref 80–99)
MONOCYTES # BLD AUTO: 0.9 10^3/UL (ref 0–1)
MONOCYTES NFR BLD AUTO: 12 % (ref 0–12)
NEUTROPHILS # BLD AUTO: 5.8 10^3/UL (ref 1.8–7.8)
NEUTROPHILS NFR BLD AUTO: 78 % (ref 42–75)
PLATELET # BLD: 164 10^3/UL (ref 130–400)
PMV BLD AUTO: 9.5 FL (ref 9–12.2)
POTASSIUM SERPL-SCNC: 4 MMOL/L (ref 3.6–5)
PROT SERPL-MCNC: 6.5 GM/DL (ref 6.4–8.2)
SODIUM SERPL-SCNC: 140 MMOL/L (ref 135–145)
WBC # BLD AUTO: 7.5 10^3/UL (ref 4.3–11)

## 2023-03-15 RX ADMIN — SENNOSIDES SCH MG: 8.6 TABLET, FILM COATED ORAL at 20:40

## 2023-03-15 RX ADMIN — INSULIN ASPART SCH UNIT: 100 INJECTION, SOLUTION INTRAVENOUS; SUBCUTANEOUS at 20:55

## 2023-03-15 RX ADMIN — SODIUM CHLORIDE SCH MLS/HR: 900 INJECTION INTRAVENOUS at 03:25

## 2023-03-15 RX ADMIN — SENNOSIDES SCH MG: 8.6 TABLET, FILM COATED ORAL at 08:46

## 2023-03-15 RX ADMIN — DOCUSATE SODIUM SCH MG: 100 CAPSULE ORAL at 08:46

## 2023-03-15 RX ADMIN — INSULIN ASPART SCH UNIT: 100 INJECTION, SOLUTION INTRAVENOUS; SUBCUTANEOUS at 11:00

## 2023-03-15 RX ADMIN — DIVALPROEX SODIUM SCH MG: 250 TABLET, FILM COATED, EXTENDED RELEASE ORAL at 20:40

## 2023-03-15 RX ADMIN — SODIUM CHLORIDE SCH MLS/HR: 900 INJECTION INTRAVENOUS at 11:08

## 2023-03-15 RX ADMIN — DOCUSATE SODIUM SCH MG: 100 CAPSULE ORAL at 20:39

## 2023-03-15 RX ADMIN — INSULIN ASPART SCH UNIT: 100 INJECTION, SOLUTION INTRAVENOUS; SUBCUTANEOUS at 06:18

## 2023-03-15 RX ADMIN — SODIUM CHLORIDE SCH MLS/HR: 900 INJECTION INTRAVENOUS at 18:11

## 2023-03-15 RX ADMIN — ENOXAPARIN SODIUM SCH MG: 100 INJECTION SUBCUTANEOUS at 20:40

## 2023-03-15 RX ADMIN — INSULIN ASPART SCH UNIT: 100 INJECTION, SOLUTION INTRAVENOUS; SUBCUTANEOUS at 16:24

## 2023-03-15 NOTE — HISTORY & PHYSICAL
ARDEN SARAH 3/15/23 1107:


History of Present Illness


History of Present Illness


Reason for visit/HPI


Yolanda is a 81 yo F w/ hx of dementia, bipolar disorder, and chronic anemia who 

presented from [facility] with AMS and fever. On 3/14 Yolanda was 

very lethargic and altered. She was refusing medications and care from the 

staff, and she was having trouble remembering her name. She was being treated 

with cefdenir for a UTI at the facility. In ED, she was found to be febrile and 

hypertensive. She had no leukocytosis but was neutrophilic. Initial ED w/u 

revealed non acute CXR and CTH. CT abdomen was positive for fat stranding and 

mild diverticulitis. Electrolytes were wnL. UA was positive for e coli UTI and 

she was started on IVF and cefepime. Following this she became less lethargic 

and more responsive to questions and she was transferred to the floor.





Today Yolanda is feeling better but says she is confused and can't remember her 

name and where she was from. She is still slow to respond to questions and does 

not always respond to questions. She denies any cough, SOB, abdominal pain, and 

hematochezia.


Date of Admission


Mar 14, 2023 at 20:35


Date Seen by a Provider:  Mar 15, 2023


Time Seen by a Provider:  10:00


I consulted on this patient on


3/15/23


 10:53


Attending Physician


Shahida Machuca DO


Admitting Physician


Admitting Physician:


Shahida Machuca DO 








Attending Physician:


Shahida Machuca DO


Consult








Allergies and Home Medications


Allergies


Coded Allergies:  


     codeine (Verified  Allergy, Unknown, 2/22/23)


     lurasidone (Verified  Allergy, Unknown, 2/22/23)


     quetiapine (Verified  Allergy, Unknown, 2/22/23)


     trifluoperazine (Verified  Allergy, Unknown, 2/22/23)





Patient Home Medication List


Home Medication List Reviewed:  Yes


Acetaminophen (Tylenol Arthritis) 650 Mg Tablet.er, 650 MG PO Q4H PRN for PAIN-

MILD (1-4), (Reported)


   Entered as Reported by: DAVID SAM on 2/23/23 1341


Alendronate Sodium (Alendronate Sodium) 70 Mg Tablet, 70 MG PO FRI, (Reported)


   Entered as Reported by: DAVID SAM on 2/23/23 1341


Aspirin (Aspirin EC) 81 Mg Tablet.dr, 81 MG PO DAILY PRN, (Reported)


   Entered as Reported by: DAVID SAM on 2/23/23 1341


   Last Action: Continued


Atorvastatin Calcium (Atorvastatin Calcium) 40 Mg Tablet, 40 MG PO DAILY, 

(Reported)


   Entered as Reported by: DAVID SAM on 2/23/23 1341


   Last Action: Continued


Calcium Carbonate/Vitamin D3 (Calcium 600 + Vit D 200 Tablet) 600 Mg Calcium-5 

Mcg (200 Unit) Tablet, 1 EACH PO DAILY, (Reported)


   Entered as Reported by: DAVID SAM on 2/23/23 1341


Carboxymethylcellulose Sodium (Lubricant Eye Drops) 0.5 % Drops, 1 DROP OU QID, 

(Reported)


   Entered as Reported by: DAVID SAM on 2/23/23 1341


Cefdinir (Cefdinir) 300 Mg Capsule, 300 MG PO BID


   Prescribed by: SHAHIDA MACHUCA on 2/25/23 1129


Cholecalciferol (Vitamin D3) (Vitamin D3) 50 Mcg (2000 Unit) Capsule, 50 MCG PO 

DAILY, (Reported)


   Entered as Reported by: DAVID SAM on 2/23/23 1341


Divalproex Sodium (Divalproex Sodium) 500 Mg Tablet.dr, 1,500 MG PO HS, 

(Reported)


   Entered as Reported by: DAVID SAM on 2/23/23 1341


   Last Action: Converted


Ferrous Sulfate (Ferosul) 325 Mg (65 Mg Iron) Tablet, 325 MG PO DAILY, 

(Reported)


   Entered as Reported by: DAVID SAM on 2/23/23 1341


Fluticasone Propionate (Flonase Allergy Relief) 50 Mcg/Actuation [place].susp, 1 

SPRAY NSEACH BID, (Reported)


   Entered as Reported by: DAVID SAM on 2/23/23 1341


   Last Action: Converted


Furosemide (Furosemide) 40 Mg Tablet, 40 MG PO DAILY, (Reported)


   Entered as Reported by: DAVID SAM on 2/23/23 1341


   Last Action: Continued


Haloperidol Lactate (Haloperidol Lactate) 2 Mg/Ml Oral.conc, 0.5 ML PO BID, 

(Reported)


   Entered as Reported by: DAVID SAM on 2/23/23 1341


   Last Action: Converted


L. Rhamnosus GG/Inulin (Probiotic Digest Supp 20B Cfu) 20 Billion Cell-200 Mg 

Capsule, 1 EACH PO DAILY, (Reported)


   Entered as Reported by: DAVID SAM on 2/23/23 1341


Latanoprost (Xalatan) 0.005 % Drops, 1 DROP OU HS, (Reported)


   Entered as Reported by: DAVID SAM on 2/23/23 1341


   Last Action: Continued


Levothyroxine Sodium (Levothyroxine Sodium) 112 Mcg Tablet, 112 MCG PO DAILY, 

(Reported)


   Entered as Reported by: DAVID SAM on 2/23/23 1341


   Last Action: Continued


Losartan Potassium (Losartan Potassium) 100 Mg Tablet, 100 MG PO DAILY, 

(Reported)


   Entered as Reported by: DAVID SAM on 2/23/23 1341


   Last Action: Continued


Lutein (Lutein) 20 Mg Tablet, 20 MG PO DAILY, (Reported)


   Entered as Reported by: DAVID SAM on 2/23/23 1341


Magnesium Hydroxide (Milk of Magnesia) 400 Mg/5 Ml Oral.susp, 30 ML PO DAILY PRN

for CONSTIPATION-7TH LINE, (Reported)


   Entered as Reported by: DAVID SAM on 2/23/23 1341


Metoprolol Succinate (Metoprolol Succinate) 25 Mg Tab.er.24h, 25 MG PO DAILY, 

(Reported)


   Entered as Reported by: DAVID SAM on 2/23/23 1341


   Last Action: Continued


Multivitamin with Minerals (One Daily Plus Minerals) 1 Each Tablet, 1 EACH PO 

DAILY, (Reported)


   Entered as Reported by: DAVID SAM on 2/23/23 1341


Nicotine (Nicotine Patch) 21 Mg/24 Hour Patch.td24, 21 MG TD DAILY PRN for 

SMOKING CESSATION, (Reported)


   Entered as Reported by: DAVID SAM on 2/23/23 1341


Omeprazole (Omeprazole) 40 Mg Capsule.dr, 40 MG PO DAILY, (Reported)


   Entered as Reported by: DAVID SAM on 2/23/23 1341


Polyethylene Glycol 3350 (Miralax) 17 Gram Powd.pack, 17 GM PO DAILY, (Reported)


   Entered as Reported by: DAVID SAM on 2/23/23 1341


Potassium Chloride (Klor-Con 10) 10 Meq Tablet.er, 10 MEQ PO DAILY, (Reported)


   Entered as Reported by: DAVID SAM on 2/23/23 1341


Sodium Chloride (Ayr Saline) 0.65 % [place], 1 SPRAY NSEACH UD PRN for DRY NOSE, 

(Reported)


   Entered as Reported by: DAVID SAM on 2/23/23 1341


Vitamin B Complex (Vitamin B Complex) 1 Each Tablet, 1 EACH PO DAILY, (Reported)


   Entered as Reported by: DAVID SAM on 2/23/23 1341





Past Medical-Social-Family Hx


Patient Social History


Employed/Student:  retired


Tobacco Use?:  Yes


Tobacco type used:  Cigarettes


Smoking Status:  Former Smoker


Smokeless Tobacco Frequency:  Never a User


Use of E-Cig and/or Vaping dev:  Unable to obtain


Use of E-Cig and/or Vaping Sky:  Never a User


Substance use?:  Unable to obtain


Alcohol Use?:  No


Pt feels they are or have been:  No





Immunizations Up To Date


Date of Influenza Vaccine:  Oct 18, 2023


Tetanus Booster (TDap):  Unknown


Hepatitis A:  No


Hepatitis B:  No





Current Status


Pregnancy status:  No


Breastfeeding status:  No


Advance Directives:  No


Communicates:  Verbally


Primary Language:  English


Preferred Spoken Language:  English


Is interpretation needed?:  No


Implanted or Applied Medical D:  CPAP





Past Medical History


Sleep Apnea


Currently Using CPAP:  No


Currently Using BIPAP:  No


High Cholesterol, Hypertension, Valvular Heart Disease


Dementia


Bladder Infection


Gastroesophageal Reflux, Diverticulosis


Hypothyroidsim


Bipolar, Depression


Blood Disorders:  Yes (Anemia)





Family Medical History


No Pertinent Family Hx





Review of Systems


Constitutional:  fever, malaise; No weakness


EENTM:  No blurred vision, No vision loss, No hoarseness, No nose congestion, No

throat pain, No throat swelling


Respiratory:  No cough, No hemoptysis, No short of breath


Cardiovascular:  No chest pain, No palpitations


Gastrointestinal:  No abdominal pain, No constipation, No diarrhea, No 

hematemesis, No melena, No nausea, No vomiting


Genitourinary:  No dysuria; frequency; No hematuria


Musculoskeletal:  No joint pain, No joint swelling, No muscle pain


Skin:  no symptoms reported


Psychiatric/Neurological:  Anxiety; Denies Numbness, Denies Tingling, Denies 

Tremors, Denies Weakness





All Other Systems Reviewed


Negative Unless Noted:  Yes





Physical Exam


Vital Signs





Vital Signs - First Documented








 3/14/23 3/14/23 3/14/23





 18:23 19:15 21:40


 


Temp 38.1  


 


Pulse 74  


 


Resp 18  


 


B/P (MAP) 161/94 (116)  


 


Pulse Ox 93  


 


O2 Delivery Room Air  


 


O2 Flow Rate  2.00 


 


FiO2   21





Capillary Refill : Less Than 3 Seconds


Height, Weight, BMI


Height: '"


Weight: lbs. oz. kg; 35.42 BMI


Method:


General Appearance:  No Apparent Distress


Eyes:  Bilateral Eye Normal Inspection, Bilateral Eye PERRL, Bilateral Eye EOMI


HEENT:  PERRL/EOMI, Pharynx Normal, Moist Mucous Membranes; No Photophobia, No 

Scleral Icterus (L), No Scleral Icterus (R)


Neck:  Normal Inspection, Non Tender, Supple; No JVD


Respiratory:  Lungs Clear, Normal Breath Sounds, No Respiratory Distress


Cardiovascular:  Regular Rate, Rhythm, No JVD, Normal Peripheral Pulses


Gastrointestinal:  Non Tender, Soft; No Distended, No Guarding, No Rebound


Back:  No CVA Tenderness (L), No CVA Tenderness (R)


Extremity:  Normal Capillary Refill, Normal Inspection, No Pedal Edema


Neurologic/Psychiatric:  Alert; No Motor Weakness, No Sensory Deficit; Other 

(oriented x2, place and time. CNII-XII intact. PERRL, EOMI. decreased hearing 

left ear.)


Reflexes:  2+ Bicep (R), 2+ Bicep (L), 2+ Tricep (R), 2+ Tricep (L), 2+ Knee 

(R), 2+ Knee (L), 2+ Ankle (R), 2+ Ankle (L)


Skin:  Normal Color, Warm/Dry





Assessment/Plan


Assessment and Plan


Acute Encephalopathy


E coli UTI


-CTH without acute process


-CXR reviewed


-electrolytes wnl, no leukocytosis


-Hx of ams with uti in the past


-UA positive for E coli, pending susceptibility


-was on cefdenir, switched to cefepime


-did not meet sirs criteria, no signs of shock


-If ESBL, will switch to meropenem


-mental status improving, monitoring





Diverticulitis


Hx of Diverticulosis


-mild fat stranding on CT abdomen


-not complaining of diarrhea, pain currently


-monitor





Bipolar Disorder


-Valproate levels checked, 35.4


-restarting pending pharmacy





HTN


-restart home meds





Chronic anemia


-normocytic, hgb 12.8 on admit


-monitoring





Dvt ppx: lovenox


diet: full


GI ppx: protonix





Dispo: awaiting susceptibilities, holding on floor for now, mentation improving.





Admission Diagnosis


Admission Status:  Inpatient Order (span 2 midnights)


Reason for Inpatient Admission:  


UTI


AMS





SHAHIDA MACHUCA DO 3/16/23 0457:


Allergies and Home Medications


Allergies


Coded Allergies:  


     codeine (Verified  Allergy, Unknown, 2/22/23)


     lurasidone (Verified  Allergy, Unknown, 2/22/23)


     quetiapine (Verified  Allergy, Unknown, 2/22/23)


     trifluoperazine (Verified  Allergy, Unknown, 2/22/23)





Patient Home Medication List


Acetaminophen (Tylenol Arthritis) 650 Mg Tablet.er, 650 MG PO Q4H PRN for PAIN-

MILD (1-4), (Reported)


   Entered as Reported by: DAVID SAM on 2/23/23 1341


Alendronate Sodium (Alendronate Sodium) 70 Mg Tablet, 70 MG PO FRI, (Reported)


   Entered as Reported by: DAVID SAM on 2/23/23 1341


Aspirin (Aspirin EC) 81 Mg Tablet.dr, 81 MG PO DAILY PRN, (Reported)


   Entered as Reported by: DAVID SAM on 2/23/23 1341


   Last Action: Continued


Atorvastatin Calcium (Atorvastatin Calcium) 40 Mg Tablet, 40 MG PO DAILY, 

(Reported)


   Entered as Reported by: DAVID SAM on 2/23/23 1341


   Last Action: Continued


Calcium Carbonate/Vitamin D3 (Calcium 600 + Vit D 200 Tablet) 600 Mg Calcium-5 

Mcg (200 Unit) Tablet, 1 EACH PO DAILY, (Reported)


   Entered as Reported by: DAVID SAM on 2/23/23 1341


Carboxymethylcellulose Sodium (Lubricant Eye Drops) 0.5 % Drops, 1 DROP OU QID, 

(Reported)


   Entered as Reported by: DAVID SAM on 2/23/23 1341


Cefdinir (Cefdinir) 300 Mg Capsule, 300 MG PO BID


   Prescribed by: SHAHIDA MACHUCA on 2/25/23 1129


Cholecalciferol (Vitamin D3) (Vitamin D3) 50 Mcg (2000 Unit) Capsule, 50 MCG PO 

DAILY, (Reported)


   Entered as Reported by: DAVID SAM on 2/23/23 1341


Divalproex Sodium (Divalproex Sodium) 500 Mg Tablet.dr, 1,500 MG PO HS, (

Reported)


   Entered as Reported by: DAVID SAM on 2/23/23 1341


   Last Action: Converted


Ferrous Sulfate (Ferosul) 325 Mg (65 Mg Iron) Tablet, 325 MG PO DAILY, 

(Reported)


   Entered as Reported by: DAVID SAM on 2/23/23 1341


Fluticasone Propionate (Flonase Allergy Relief) 50 Mcg/Actuation [place].susp, 1 

SPRAY NSEACH BID, (Reported)


   Entered as Reported by: DAVID SAM on 2/23/23 1341


   Last Action: Converted


Furosemide (Furosemide) 40 Mg Tablet, 40 MG PO DAILY, (Reported)


   Entered as Reported by: DAVID SAM on 2/23/23 1341


   Last Action: Continued


Haloperidol Lactate (Haloperidol Lactate) 2 Mg/Ml Oral.conc, 0.5 ML PO BID, 

(Reported)


   Entered as Reported by: DAVID SAM on 2/23/23 1341


   Last Action: Converted


L. Rhamnosus GG/Inulin (Probiotic Digest Supp 20B Cfu) 20 Billion Cell-200 Mg 

Capsule, 1 EACH PO DAILY, (Reported)


   Entered as Reported by: DAVID SAM on 2/23/23 1341


Latanoprost (Xalatan) 0.005 % Drops, 1 DROP OU HS, (Reported)


   Entered as Reported by: DAVID SAM on 2/23/23 1341


   Last Action: Continued


Levothyroxine Sodium (Levothyroxine Sodium) 112 Mcg Tablet, 112 MCG PO DAILY, 

(Reported)


   Entered as Reported by: DAVID SAM on 2/23/23 1341


   Last Action: Continued


Losartan Potassium (Losartan Potassium) 100 Mg Tablet, 100 MG PO DAILY, 

(Reported)


   Entered as Reported by: DAVID SAM on 2/23/23 1341


   Last Action: Continued


Lutein (Lutein) 20 Mg Tablet, 20 MG PO DAILY, (Reported)


   Entered as Reported by: DAVID SAM on 2/23/23 1341


Magnesium Hydroxide (Milk of Magnesia) 400 Mg/5 Ml Oral.susp, 30 ML PO DAILY PRN

for CONSTIPATION-7TH LINE, (Reported)


   Entered as Reported by: DAVID SAM on 2/23/23 1341


Metoprolol Succinate (Metoprolol Succinate) 25 Mg Tab.er.24h, 25 MG PO DAILY, 

(Reported)


   Entered as Reported by: DAVID SAM on 2/23/23 1341


   Last Action: Continued


Multivitamin with Minerals (One Daily Plus Minerals) 1 Each Tablet, 1 EACH PO 

DAILY, (Reported)


   Entered as Reported by: DAVID SAM on 2/23/23 1341


Nicotine (Nicotine Patch) 21 Mg/24 Hour Patch.td24, 21 MG TD DAILY PRN for 

SMOKING CESSATION, (Reported)


   Entered as Reported by: DAVID SAM on 2/23/23 1341


Omeprazole (Omeprazole) 40 Mg Capsule.dr, 40 MG PO DAILY, (Reported)


   Entered as Reported by: DAVID SAM on 2/23/23 1341


Polyethylene Glycol 3350 (Miralax) 17 Gram Powd.pack, 17 GM PO DAILY, (Reported)


   Entered as Reported by: DAVID SAM on 2/23/23 1341


Potassium Chloride (Klor-Con 10) 10 Meq Tablet.er, 10 MEQ PO DAILY, (Reported)


   Entered as Reported by: DAVID SAM on 2/23/23 1341


Sodium Chloride (Ayr Saline) 0.65 % [place], 1 SPRAY NSEACH UD PRN for DRY NOSE, 

(Reported)


   Entered as Reported by: DAVID SAM on 2/23/23 1341


Vitamin B Complex (Vitamin B Complex) 1 Each Tablet, 1 EACH PO DAILY, (Reported)


   Entered as Reported by: DAVID SAM on 2/23/23 1341





Past Medical-Social-Family Hx


Patient Social History


Marrital Status:  single


Employed/Student:  retired


Smoking Status:  Former Smoker





Past Medical History


Dementia





Review of Systems


Constitutional:  see HPI





Physical Exam


General Appearance:  No Apparent Distress, WD/WN, Chronically ill


Respiratory:  Lungs Clear, Normal Breath Sounds


Cardiovascular:  Regular Rate, Rhythm


Neurologic/Psychiatric:  Other (oriented x2, place and time. CNII-XII intact. 

PERRL, EOMI. decreased hearing left ear.)





Assessment/Plan


Assessment and Plan


Problems:  


(1) Sepsis


(2) Altered mental status


Status:  Acute


(3) UTI (urinary tract infection)


Status:  Acute





Admission Diagnosis


Admission Status:  Inpatient Order (span 2 midnights)


Reason for Inpatient Admission:  


ams esbl uti





Supervisory-Addendum Brief


Verification & Attestation


Participated in pt care:  history, MDM, physical


Personally performed:  exam, history, MDM, supervision of care


Care discussed with:  Medical Student


Procedures:  n/a


Results interpretation:  Verified all documentation


Verification and Attestation of Medical Student E/M Service





A medical student performed and documented this service in my presence. I 

reviewed and verified all information documented by the medical student and made

modifications to such information, when appropriate. I personally performed the 

physical exam and medical decision making. 





 Shahida Machuca, Mar 16, 2023,04:55











ARDEN SARAH               Mar 15, 2023 11:07


SHAHIDA MACHUCA DO                Mar 16, 2023 04:57

## 2023-03-15 NOTE — OCCUPATIONAL THERAPY EVAL
OT Evaluation-General/PLF


Medical Diagnosis


Admission Date


Mar 14, 2023 at 20:35


Medical Diagnosis:  sepsis,


Onset Date:  Mar 14, 2023





Therapy Diagnosis


Therapy Diagnosis:  weakness, confusion





Precautions


Precautions/Isolations:  Fall Prevention, Standard Precautions





Weight Bear Status


Weight Bearing Restriction:  Full Weight Bearing


Location Restriction:  LE Bilateral





Referral


Referral Reason:  Activity Tolerance, Self Care, Evaluation/Treatment, 

Strengthening/ROM





Medical History


Pertinent Medical History:  Dementia


Additional Medical History


Sepsis, UTI, on 03/14/23 from [place] ED


Current History


[facility], 24 hour care. Patient known to OT, patient is at 

physical baseline however remains minimally confused


Reviewed History:  Yes





Social History


Home:  Nursing Home


Entry Into Home:  Level Entry





ADL-Prior Level of Function


SCALE: Activities may be completed with or without assistive devices.





6-Indepedent-patient completes the activity by him/herself with no assistance 

from a helper.


5-Set-up or Clean-up Assistance-helper sets up or cleans up; patient completes 

activity. [place] assists only prior to or  


    following the activity.


4-Supervision or Touching Assistance-helper provides verbal cues and/or 

touching/steadying and/or contact guard assistance as patient completes 

activity. Assistance may be provided   


    throughout the activity or intermittently.


3-Partial/Moderate Assistance-helper does LESS THAN HALF the effort. [place] 

lifts, holds or supports trunk or limbs, but provides less than half the effort.


2-Substantial/Maximal Assistance-helper does MORE THAN HALF the effort. [place] 

lifts or holds trunk or limbs and provides more than half the effort.


7-Fkletuucb-bgskim does ALL the effort. Patient does none of the effort to 

complete the activity. Or, the assistance of 2 or more helpers is required for 

the patient to complete the  


    activity.


If activity was not attempted, code reason:


7-Patient Refused.


9-Not Applicable-not attempted and the patient did not perform the activity 

before the current illness, exacerbation or injury.


10-Not Attempted due to Environmental Limitations-(lack of equipment, weather 

restraints, etc.).


88-Not Attempted due to Medical Conditions or Safety Concerns.


Self Care:  Needed Some Help


Functional Cognition:  Needed Some Help


Drive Self:  No





OT Current Status


Subjective


Up in bed, agreeable to OT, request time Dr will visit, patient informs me she 

went to the hospital





Mental Status/Objective


Attachments:  Rm Catheter, IV, Oxygen





ADL-Treatment


Eating (QC):  6


Oral Hygiene (QC):  4 (set up, verbal cues to initate and next step instruction 

required)


Shower/Bathe Self (QC):  7


Upper Body Dressing (QC):  4


Lower Body Dressing (QC):  3


On/Off Footwear (QC):  3


Toileting Hygiene (QC):  88 (rm, declined need for BM)


B/B incont PLOF





Education


OT Patient Education:  Correct positioning, Modified ADL techniques, Progress 

toward Goal/Update tx plan, Purpose of tx/functional activities, Reviewed 

precautions, Rehab process, Safety issues, Transfer techniques, Use of adapted 

equipment


Teaching Recipient:  Patient


Teaching Methods:  Demonstration, Discussion


Response to Teaching:  Reinforcement Needed





OT Long Term Goals


Long Term Goals


1=Demonstrate adherence to instructed precautions during ADL tasks.


2=Patient will verbalize/demonstrate understanding of assistive device

s/modifications for ADL.


3=Patient will improve strength/tolerance for activity to enable patient to 

perform ADL's.





OT Education/Plan


Problem List/Assessment


Assessment:  No Skilled OT Needs ID'd (PLOF physically for ADLS, 24 hour care, 

receives assistance at NH)





Discharge Recommendations


Plan/Recommendations:  Discontinue OT





Treatment Plan/Plan of Care


Treatment,Training & Education:  Yes


Patient would benefit from OT for education, treatment and training to promote 

independence in ADL's, mobility, safety and/or upper extremity function for 

ADL's.


Plan of Care:  OTHER (OT EVALonly)


Treatment Duration:  Mar 15, 2023


Frequency:  1 time per week


Estimated Hrs Per Day:  .25 hour per day


Agreement:  Yes


Rehab Potential:  Guarded





Time


Start Time:  09:33


Stop Time:  09:53


DATE:  Mar 15, 2023


Total Time Billed (hr/min):  20


Billed Treatment Time


EVL only 20 min











DRINNEN,MICHELLE OT            Mar 15, 2023 10:16

## 2023-03-15 NOTE — PHYSICAL THERAPY EVALUATION
PT Evaluation-General


Medical Diagnosis


Admission Date


Mar 14, 2023 at 20:35


Medical Diagnosis:  sepsis,


Onset Date:  Mar 14, 2023





Therapy Diagnosis


Therapy Diagnosis:  Gait Deficit, strength deficit





Precautions


Precautions/Isolations:  Fall Prevention, Standard Precautions





Weight Bear Status


Right Lower Extremity:  Right


Full Weight Bearing


Left Lower Extremity:  Left


Full Weight Bearing





Referral


Physician:  Dr. Machuca


Reason for Referral:  Evaluation/Treatment





Medical History


Pertinent Medical History:  Dementia


Reviewed History:  Yes





Social History


Home:  Nursing Home


Entry Into Home:  Level Entry





Prior


Prior Level of Function


SCALE: Activities may be completed with or without assistive devices.





6-Indepedent-patient completes the activity by him/herself with no assistance 

from a helper.


5-Set-up or Clean-up Assistance-helper sets up or cleans up; patient completes 

activity. [place] assists only prior to or  


    following the activity.


4-Supervision or Touching Assistance-helper provides verbal cues and/or 

touching/steadying and/or contact guard assistance as patient completes 

activity. Assistance may be provided   


    throughout the activity or intermittently.


3-Partial/Moderate Assistance-helper does LESS THAN HALF the effort. [place] 

lifts, holds or supports trunk or limbs, but provides less than half the effort.


2-Substantial/Maximal Assistance-helper does MORE THAN HALF the effort. [place] 

lifts or holds trunk or limbs and provides more than half the effort.


8-Hqinlhvtq-gbsqfc does ALL the effort. Patient does none of the effort to 

complete the activity. Or, the assistance of 2 or more helpers is required for 

the patient to complete the  


    activity.


If activity was not attempted, code reason:


7-Patient Refused.


9-Not Applicable-not attempted and the patient did not perform the activity 

before the current illness, exacerbation or injury.


10-Not Attempted due to Environmental Limitations-(lack of equipment, weather 

restraints, etc.).


88-Not Attempted due to Medical Conditions or Safety Concerns.


Bed Mobility:  6


Transfers (B,C,W/C):  6


Gait:  6


Indoor Mobility (Ambulation):  Needed Some Help


Prior Devices Use:  Walker





PT Evaluation-Current


Objective


Patient Orientation:  Person, Place, Time, Situation


Attachments:  Oxygen, Sotelo Catheter, IV





ROM/Strength


ROM Lower Extremities


WFLs BLEs all planes


Strength Lower Extremities


3+/5 BLEs all planes.





Sensory


Vision:  Functional


Hearing:  Functional


Sensation Right Lower Extremit:  Intact


Sensation Left Lower Extremity:  Intact





Transfers


Sit to Stand (QC):  4


Chair/Bed-to-Chair Xfer(QC):  4





Gait


Does the Patient Walk?:  Yes


Mode of Locomotion:  Walk


Anticipated Mode of Locomotion:  Walk


Walk 10 feet (QC):  4


Walk 50 ft with 2 Turns(QC):  4


Distance:  120 feet


Gait Assistive Device:  FWW





Balance


Sitting Static:  Fair


Sitting Dynamic:  Fair


Standing Static:  Fair


 Standing Dynamic:  Fair





Assessment/Needs


Patient tolerated treatment fair.  Very drowsy upon PT arrival and at times 

throughout the treatment pauses, almost as if she is falling asleep.  Patient 

performs all observed transfers with SBA.  She ambulates 120 feet with FWW, with

SBA and verbal cues for safety, progression, balance and posture.  Patient in 

chair post treatment with all needs met, nursing notified, call light in reach.


Rehab Potential:  Fair





PT Long Term Goals


Long Term Goals


PT Long Term Goals Time Frame:  Mar 31, 2023


Roll Left & Right (QC):  6


Sit to Lying (QC):  6


Lying-Sitting on Side/Bed(QC):  6


Sit to Stand (QC):  6


Chair/Bed-to-Chair Xfer(QC):  6


Toilet Transfer (QC):  6


Does the Patient Walk:  Yes


Walk 10 feet (QC):  6


Walk 50ft with 2 Turns (QC):  6


Walk 150 ft (QC):  6





PT Plan


Problem List


Problem List:  Activity Tolerance, Functional Strength, Safety, Balance, Gait, 

Transfer, Bed Mobility, ROM





Treatment/Plan


Treatment Plan:  Continue Plan of Care


Treatment Plan:  Bed Mobility, Education, Functional Activity Jared, Functional 

Strength, Group Therapy, Gait, Safety, Therapeutic Exercise, Transfers


Treatment Duration:  Mar 31, 2023


Frequency:  6 times per week


Estimated Hrs Per Day:  .25 hour per day


Patient and/or Family Agrees t:  Yes





Safety Risks/Education


Patient Education:  Gait Training, Transfer Techniques


Teaching Recipient:  Patient


Teaching Methods:  Demonstration, Discussion


Response to Teaching:  Verbalize Understanding, Return Demonstration





Time


Time In:  1016


Time Out:  1031


DATE:  Mar 15, 2023


Total Billed Treatment Time:  15


Total Billed Treatment


Visit, JENNIFER Yuen PT                Mar 15, 2023 11:41

## 2023-03-16 VITALS — DIASTOLIC BLOOD PRESSURE: 70 MMHG | SYSTOLIC BLOOD PRESSURE: 136 MMHG

## 2023-03-16 VITALS — SYSTOLIC BLOOD PRESSURE: 139 MMHG | DIASTOLIC BLOOD PRESSURE: 70 MMHG

## 2023-03-16 VITALS — DIASTOLIC BLOOD PRESSURE: 69 MMHG | SYSTOLIC BLOOD PRESSURE: 133 MMHG

## 2023-03-16 VITALS — SYSTOLIC BLOOD PRESSURE: 103 MMHG | DIASTOLIC BLOOD PRESSURE: 59 MMHG

## 2023-03-16 VITALS — SYSTOLIC BLOOD PRESSURE: 138 MMHG | DIASTOLIC BLOOD PRESSURE: 65 MMHG

## 2023-03-16 VITALS — SYSTOLIC BLOOD PRESSURE: 113 MMHG | DIASTOLIC BLOOD PRESSURE: 65 MMHG

## 2023-03-16 VITALS — DIASTOLIC BLOOD PRESSURE: 56 MMHG | SYSTOLIC BLOOD PRESSURE: 117 MMHG

## 2023-03-16 LAB
ALBUMIN SERPL-MCNC: 2.8 GM/DL (ref 3.2–4.5)
ALP SERPL-CCNC: 46 U/L (ref 40–136)
ALT SERPL-CCNC: 7 U/L (ref 0–55)
BASOPHILS # BLD AUTO: 0 10^3/UL (ref 0–0.1)
BASOPHILS NFR BLD AUTO: 0 % (ref 0–10)
BILIRUB SERPL-MCNC: 0.3 MG/DL (ref 0.1–1)
BUN/CREAT SERPL: 20
CALCIUM SERPL-MCNC: 8.7 MG/DL (ref 8.5–10.1)
CHLORIDE SERPL-SCNC: 101 MMOL/L (ref 98–107)
CO2 SERPL-SCNC: 27 MMOL/L (ref 21–32)
CREAT SERPL-MCNC: 0.7 MG/DL (ref 0.6–1.3)
EOSINOPHIL # BLD AUTO: 0 10^3/UL (ref 0–0.3)
EOSINOPHIL NFR BLD AUTO: 1 % (ref 0–10)
GFR SERPLBLD BASED ON 1.73 SQ M-ARVRAT: 87 ML/MIN
GLUCOSE SERPL-MCNC: 100 MG/DL (ref 70–105)
HCT VFR BLD CALC: 35 % (ref 35–52)
HGB BLD-MCNC: 11.3 G/DL (ref 11.5–16)
LYMPHOCYTES # BLD AUTO: 1.1 10^3/UL (ref 1–4)
LYMPHOCYTES NFR BLD AUTO: 17 % (ref 12–44)
MANUAL DIFFERENTIAL PERFORMED BLD QL: NO
MCH RBC QN AUTO: 32 PG (ref 25–34)
MCHC RBC AUTO-ENTMCNC: 33 G/DL (ref 32–36)
MCV RBC AUTO: 99 FL (ref 80–99)
MONOCYTES # BLD AUTO: 0.9 10^3/UL (ref 0–1)
MONOCYTES NFR BLD AUTO: 13 % (ref 0–12)
NEUTROPHILS # BLD AUTO: 4.7 10^3/UL (ref 1.8–7.8)
NEUTROPHILS NFR BLD AUTO: 67 % (ref 42–75)
PLATELET # BLD: 165 10^3/UL (ref 130–400)
PMV BLD AUTO: 9.8 FL (ref 9–12.2)
POTASSIUM SERPL-SCNC: 4.1 MMOL/L (ref 3.6–5)
PROT SERPL-MCNC: 6.4 GM/DL (ref 6.4–8.2)
SODIUM SERPL-SCNC: 137 MMOL/L (ref 135–145)
WBC # BLD AUTO: 6.9 10^3/UL (ref 4.3–11)

## 2023-03-16 RX ADMIN — LOSARTAN POTASSIUM SCH MG: 100 TABLET, FILM COATED ORAL at 08:46

## 2023-03-16 RX ADMIN — HALOPERIDOL SCH MG: 0.5 TABLET ORAL at 20:56

## 2023-03-16 RX ADMIN — LEVOTHYROXINE SODIUM SCH MCG: 0.11 TABLET ORAL at 05:20

## 2023-03-16 RX ADMIN — DIVALPROEX SODIUM SCH MG: 250 TABLET, FILM COATED, EXTENDED RELEASE ORAL at 20:57

## 2023-03-16 RX ADMIN — MEROPENEM SCH MLS/HR: 1 INJECTION, POWDER, FOR SOLUTION INTRAVENOUS at 17:38

## 2023-03-16 RX ADMIN — SENNOSIDES SCH MG: 8.6 TABLET, FILM COATED ORAL at 20:55

## 2023-03-16 RX ADMIN — SENNOSIDES SCH MG: 8.6 TABLET, FILM COATED ORAL at 08:46

## 2023-03-16 RX ADMIN — SODIUM CHLORIDE SCH MLS/HR: 900 INJECTION INTRAVENOUS at 02:37

## 2023-03-16 RX ADMIN — INSULIN ASPART SCH UNIT: 100 INJECTION, SOLUTION INTRAVENOUS; SUBCUTANEOUS at 11:32

## 2023-03-16 RX ADMIN — INSULIN ASPART SCH UNIT: 100 INJECTION, SOLUTION INTRAVENOUS; SUBCUTANEOUS at 05:21

## 2023-03-16 RX ADMIN — DOCUSATE SODIUM SCH MG: 100 CAPSULE ORAL at 20:57

## 2023-03-16 RX ADMIN — INSULIN ASPART SCH UNIT: 100 INJECTION, SOLUTION INTRAVENOUS; SUBCUTANEOUS at 16:01

## 2023-03-16 RX ADMIN — FLUTICASONE PROPIONATE SCH SPRAY: 50 SPRAY, METERED NASAL at 08:46

## 2023-03-16 RX ADMIN — ENOXAPARIN SODIUM SCH MG: 100 INJECTION SUBCUTANEOUS at 20:53

## 2023-03-16 RX ADMIN — DOCUSATE SODIUM SCH MG: 100 CAPSULE ORAL at 08:46

## 2023-03-16 RX ADMIN — FLUTICASONE PROPIONATE SCH SPRAY: 50 SPRAY, METERED NASAL at 20:57

## 2023-03-16 RX ADMIN — HALOPERIDOL SCH MG: 0.5 TABLET ORAL at 08:50

## 2023-03-16 RX ADMIN — ASPIRIN SCH MG: 81 TABLET ORAL at 08:46

## 2023-03-16 RX ADMIN — INSULIN ASPART SCH UNIT: 100 INJECTION, SOLUTION INTRAVENOUS; SUBCUTANEOUS at 20:36

## 2023-03-16 RX ADMIN — FUROSEMIDE SCH MG: 40 TABLET ORAL at 08:47

## 2023-03-16 NOTE — PROGRESS NOTE
Subjective


Date Seen by a Provider:  Mar 16, 2023


Time Seen by a Provider:  11:00


Subjective/Events-last exam


No major events


IV Meropenem ordered since ESBL


No pain reported


Improved lucidity


Review of Systems


General:  Fatigue, Malaise





Focused Exam


Lactate Level


3/14/23 18:30: Lactic Acid Level 2.89*H


3/14/23 20:37: Lactic Acid Level 1.32


Time of Focused Exam:  19:45





Objective


Exam


Last Set of Vital Signs





Vital Signs








  Date Time  Temp Pulse Resp B/P (MAP) Pulse Ox O2 Delivery O2 Flow Rate FiO2


 


3/16/23 04:35 36.8       


 


3/16/23 03:30  80 18 139/70 (93) 94 Nasal Cannula 1.00 


 


3/14/23 21:40        21





Capillary Refill : Less Than 3 Seconds


I&O











Intake and Output 


 


 3/16/23





 00:00


 


Intake Total 2790 ml


 


Output Total 1550 ml


 


Balance 1240 ml


 


 


 


Intake Oral 1290 ml


 


IV Total 1500 ml


 


Output Urine Total 1550 ml








General:  Alert, Cooperative, No Acute Distress


Lungs:  Clear to Auscultation


Heart:  Regular Rate





Results


Lab


Laboratory Tests


3/15/23 07:28: Glucometer 90


3/15/23 10:32: Glucometer 99


3/15/23 15:19: Glucometer 105


3/15/23 20:50: Glucometer 123H


3/16/23 05:20: Glucometer 104


3/16/23 05:41: 


White Blood Count 6.9, Red Blood Count 3.51L, Hemoglobin 11.3L, Hematocrit 35, 

Mean Corpuscular Volume 99, Mean Corpuscular Hemoglobin 32, Mean Corpuscular 

Hemoglobin Concent 33, Red Cell Distribution Width 12.5, Platelet Count 165, 

Mean Platelet Volume 9.8, Immature Granulocyte % (Auto) 2, Neutrophils (%) 

(Auto) 67, Lymphocytes (%) (Auto) 17, Monocytes (%) (Auto) 13H, Eosinophils (%) 

(Auto) 1, Basophils (%) (Auto) 0, Neutrophils # (Auto) 4.7, Lymphocytes # (Auto)

1.1, Monocytes # (Auto) 0.9, Eosinophils # (Auto) 0.0, Basophils # (Auto) 0.0, 

Immature Granulocyte # (Auto) 0.1





Microbiology


3/14/23 Urine Culture - Preliminary, Resulted


          Escherichia coli


          See Comments


3/14/23 Blood Culture - Preliminary, Resulted


          No growth





Assessment/Plan


Assessment/Plan


Assess & Plan/Chief Complaint


Acute Encephalopathy


E coli UTI ESBL


-CTH without acute process


-CXR reviewed


-electrolytes wnl, no leukocytosis


-Hx of ams with uti in the past


-UA positive for E coli, pending susceptibility


-was on cefdenir, switched to cefepime


-did not meet sirs criteria, no signs of shock


-If ESBL, will switch to meropenem


-mental status improving, monitoring





Diverticulitis


Hx of Diverticulosis


-mild fat stranding on CT abdomen


-not complaining of diarrhea, pain currently


-monitor





Bipolar Disorder


-Valproate levels checked, 35.4


-restarting pending pharmacy





HTN


-restart home meds





Chronic anemia


-normocytic, hgb 12.8 on admit


-monitoring





Dvt ppx: lovenox


diet: full


GI ppx: protonix





Dispo: awaiting susceptibilities, holding on floor for now, mentation improving.





Diagnosis/Problems


Diagnosis/Problems





(1) Sepsis


(2) Altered mental status


Status:  Acute


(3) UTI (urinary tract infection)


Status:  Acute





Clinical Quality Measures


Admission Status


Admission Dx














LINDSEYERROL FREDERICKDANIELA NUNEZ                Mar 16, 2023 06:19

## 2023-03-16 NOTE — PHYSICAL THERAPY DAILY NOTE
PT Daily Note-Current


Subjective


Patient agrees to PT.





Pain


Section J - Health Conditions


1. Rarely or not at all


2. Occasionally


3. Frequently


4. Almost constantly


8. Unable to answer


Pain Effect on Sleep:  1


Pain Interference with Therapy:  1


Pain Interference w/Day-to-Day:  1





Mental Status


Patient Orientation:  Person


Attachments:  Oxygen, Sotelo Catheter, IV





Transfers


SCALE: Activities may be completed with or without assistive devices.





6-Indepedent-patient completes the activity by him/herself with no assistance 

from a helper.


5-Set-up or Clean-up Assistance-helper sets up or cleans up; patient completes 

activity. [place] assists only prior to or  


    following the activity.


4-Supervision or Touching Assistance-helper provides verbal cues and/or 

touching/steadying and/or contact guard assistance as patient completes 

activity. Assistance may be provided   


    throughout the activity or intermittently.


3-Partial/Moderate Assistance-helper does LESS THAN HALF the effort. [place] 

lifts, holds or supports trunk or limbs, but provides less than half the effort.


2-Substantial/Maximal Assistance-helper does MORE THAN HALF the effort. [place] 

lifts or holds trunk or limbs and provides more than half the effort.


7-Jtfnmedhi-hcmugd does ALL the effort. Patient does none of the effort to 

complete the activity. Or, the assistance of 2 or more helpers is required for 

the patient to complete the  


    activity.


If activity was not attempted, code reason:


7-Patient Refused.


9-Not Applicable-not attempted and the patient did not perform the activity 

before the current illness, exacerbation or injury.


10-Not Attempted due to Environmental Limitations-(lack of equipment, weather 

restraints, etc.).


88-Not Attempted due to Medical Conditions or Safety Concerns.


Lying to Sitting/Side of Bed(Q:  2


Sit to Stand (QC):  3


Chair/Bed-to-Chair Xfer(QC):  3





Weight Bearing


Right Lower Extremity:  Right


Full Weight Bearing


Left Lower Extremity:  Left


Full Weight Bearing





Gait Training


Distance:  75'


Walk 10 feet (QC):  3


Walk 50 ft with 2 Turns(QC):  3


Gait Assistive Device:  FWW


slow, with PT assist to advance FWW





Assessment


Patient is very slow with verbal and physical responses.  Patient up in recliner

with needs met.  PT to increase activity as tolerated by patient.





PT Long Term Goals


Long Term Goals


PT Long Term Goals Time Frame:  Mar 31, 2023


Roll Left & Right (QC):  6


Sit to Lying (QC):  6


Lying-Sitting on Side/Bed(QC):  6


Sit to Stand (QC):  6


Chair/Bed-to-Chair Xfer(QC):  6


Toilet Transfer (QC):  6


Does the Patient Walk:  Yes


Walk 10 feet (QC):  6


Walk 50ft with 2 Turns (QC):  6


Walk 150 ft (QC):  6





PT Plan


Treatment/Plan


Treatment Plan:  Continue Plan of Care


Treatment Plan:  Bed Mobility, Education, Functional Activity Jared, Functional 

Strength, Group Therapy, Gait, Safety, Therapeutic Exercise, Transfers


Treatment Duration:  Mar 31, 2023


Frequency:  6 times per week


Estimated Hrs Per Day:  .25 hour per day


Patient and/or Family Agrees t:  Yes





Time


Time In:  1105


Time Out:  1112


DATE:  Mar 16, 2023


Total Billed Treatment Time:  13


Total Billed Treatment


1 visit


GT 13 min











JYOTHI PRETTY PT              Mar 16, 2023 11:36

## 2023-03-17 VITALS — DIASTOLIC BLOOD PRESSURE: 77 MMHG | SYSTOLIC BLOOD PRESSURE: 173 MMHG

## 2023-03-17 VITALS — SYSTOLIC BLOOD PRESSURE: 123 MMHG | DIASTOLIC BLOOD PRESSURE: 73 MMHG

## 2023-03-17 VITALS — DIASTOLIC BLOOD PRESSURE: 70 MMHG | SYSTOLIC BLOOD PRESSURE: 149 MMHG

## 2023-03-17 VITALS — SYSTOLIC BLOOD PRESSURE: 173 MMHG | DIASTOLIC BLOOD PRESSURE: 77 MMHG

## 2023-03-17 LAB
ALBUMIN SERPL-MCNC: 2.7 GM/DL (ref 3.2–4.5)
ALP SERPL-CCNC: 49 U/L (ref 40–136)
ALT SERPL-CCNC: 6 U/L (ref 0–55)
BASOPHILS # BLD AUTO: 0.1 10^3/UL (ref 0–0.1)
BASOPHILS NFR BLD AUTO: 1 % (ref 0–10)
BILIRUB SERPL-MCNC: 0.3 MG/DL (ref 0.1–1)
BUN/CREAT SERPL: 26
CALCIUM SERPL-MCNC: 8.9 MG/DL (ref 8.5–10.1)
CHLORIDE SERPL-SCNC: 103 MMOL/L (ref 98–107)
CO2 SERPL-SCNC: 30 MMOL/L (ref 21–32)
CREAT SERPL-MCNC: 0.62 MG/DL (ref 0.6–1.3)
EOSINOPHIL # BLD AUTO: 0.2 10^3/UL (ref 0–0.3)
EOSINOPHIL NFR BLD AUTO: 3 % (ref 0–10)
GFR SERPLBLD BASED ON 1.73 SQ M-ARVRAT: 89 ML/MIN
GLUCOSE SERPL-MCNC: 87 MG/DL (ref 70–105)
HCT VFR BLD CALC: 36 % (ref 35–52)
HGB BLD-MCNC: 11.5 G/DL (ref 11.5–16)
LYMPHOCYTES # BLD AUTO: 1.1 10^3/UL (ref 1–4)
LYMPHOCYTES NFR BLD AUTO: 18 % (ref 12–44)
MANUAL DIFFERENTIAL PERFORMED BLD QL: NO
MCH RBC QN AUTO: 32 PG (ref 25–34)
MCHC RBC AUTO-ENTMCNC: 32 G/DL (ref 32–36)
MCV RBC AUTO: 99 FL (ref 80–99)
MONOCYTES # BLD AUTO: 1 10^3/UL (ref 0–1)
MONOCYTES NFR BLD AUTO: 17 % (ref 0–12)
NEUTROPHILS # BLD AUTO: 3.6 10^3/UL (ref 1.8–7.8)
NEUTROPHILS NFR BLD AUTO: 59 % (ref 42–75)
PLATELET # BLD: 165 10^3/UL (ref 130–400)
PMV BLD AUTO: 9.5 FL (ref 9–12.2)
POTASSIUM SERPL-SCNC: 4.1 MMOL/L (ref 3.6–5)
PROT SERPL-MCNC: 6.3 GM/DL (ref 6.4–8.2)
SODIUM SERPL-SCNC: 140 MMOL/L (ref 135–145)
WBC # BLD AUTO: 6.1 10^3/UL (ref 4.3–11)

## 2023-03-17 RX ADMIN — HALOPERIDOL SCH MG: 0.5 TABLET ORAL at 08:56

## 2023-03-17 RX ADMIN — LEVOTHYROXINE SODIUM SCH MCG: 0.11 TABLET ORAL at 06:15

## 2023-03-17 RX ADMIN — INSULIN ASPART SCH UNIT: 100 INJECTION, SOLUTION INTRAVENOUS; SUBCUTANEOUS at 12:00

## 2023-03-17 RX ADMIN — ASPIRIN SCH MG: 81 TABLET ORAL at 08:56

## 2023-03-17 RX ADMIN — DOCUSATE SODIUM SCH MG: 100 CAPSULE ORAL at 08:56

## 2023-03-17 RX ADMIN — SENNOSIDES SCH MG: 8.6 TABLET, FILM COATED ORAL at 08:56

## 2023-03-17 RX ADMIN — MEROPENEM SCH MLS/HR: 1 INJECTION, POWDER, FOR SOLUTION INTRAVENOUS at 04:19

## 2023-03-17 RX ADMIN — INSULIN ASPART SCH UNIT: 100 INJECTION, SOLUTION INTRAVENOUS; SUBCUTANEOUS at 06:12

## 2023-03-17 RX ADMIN — LOSARTAN POTASSIUM SCH MG: 100 TABLET, FILM COATED ORAL at 08:56

## 2023-03-17 RX ADMIN — FUROSEMIDE SCH MG: 40 TABLET ORAL at 08:56

## 2023-03-17 RX ADMIN — FLUTICASONE PROPIONATE SCH SPRAY: 50 SPRAY, METERED NASAL at 08:56

## 2023-03-17 NOTE — PHYSICIAN QUERY CLARIFICATION
Physician Query-General


Query to Physician:


The medical record reflects the following clinical evidence:


Clinical Indicators: "Lethargic and altered" PTA, Documentation of Altered 

mental status on admission, GCS 14 on admission with gradual improvement to 15 


Risk Factor(s): UTI, Sepsis Hx of dementia 


Treatment:  Neuro monitoring, Treatment of UTI and Sepsis with IV ABX and fluids





Metabolic encephalopathy, in the setting of chronic dementia, present on 

admission now resolved


Other explanation of clinical findings


Unable to determine (no explanation for clinical findings)





Please clarify and document your clinical opinion in the progress notes and 

discharge summary including the definitive and/or presumptive diagnosis, (susp

ected or probable), related to the above clinical findings. Please include 

clinical findings supporting your diagnosis.





Yumi Preston, MSN, RN


Clinical  


[phone]


[email or web address]





PHYSICIAN RESPONSE:


Based on the clinical findings in the record, please respond to the query above 

on this document as an addendum. 








Physician Response:


Physician Response


Metabolic encephalopathy, in the setting of chronic dementia, present on 

admission now resolved














If you have questions please contact:


                   


:


Ext:





Thank you for your time and cooperation.


Clinical /








*********************This is a permanent part of the medical 

record*******************











YUMI PRESTON                   Mar 17, 2023 20:32


BRITTANY LINDSEY DO                Mar 17, 2023 21:20

## 2023-03-17 NOTE — DISCHARGE SUMMARY
Diagnosis/Chief Complaint


Date of Admission


Mar 14, 2023 at 20:35


Date of Discharge





Discharge Date:  Mar 17, 2023


Discharge Diagnosis


Acute Encephalopathy


E coli UTI ESBL


-CTH without acute process


-CXR reviewed


-electrolytes wnl, no leukocytosis


-Hx of ams with uti in the past


-UA positive for E coli, pending susceptibility


-was on cefdenir, switched to cefepime


-did not meet sirs criteria, no signs of shock


-If ESBL, will switch to meropenem


-mental status improving, monitoring





Diverticulitis


Hx of Diverticulosis


-mild fat stranding on CT abdomen


-not complaining of diarrhea, pain currently


-monitor





Bipolar Disorder


-Valproate levels checked, 35.4


-restarting pending pharmacy





HTN


-restart home meds





Chronic anemia


-normocytic, hgb 12.8 on admit


-monitoring





Dvt ppx: lovenox


diet: full


GI ppx: protonix





Discharge Summary


Discharge Physical Examination


Allergies:  


Coded Allergies:  


     codeine (Verified  Allergy, Unknown, 2/22/23)


     lurasidone (Verified  Allergy, Unknown, 2/22/23)


     quetiapine (Verified  Allergy, Unknown, 2/22/23)


     trifluoperazine (Verified  Allergy, Unknown, 2/22/23)


Vitals & I&Os





Vital Signs








  Date Time  Temp Pulse Resp B/P (MAP) Pulse Ox O2 Delivery O2 Flow Rate FiO2


 


3/17/23 15:45 36.4 67 18 173/77 95 Nasal Cannula 2.00 


 


3/14/23 21:40        21








General Appearance:  Alert, Oriented X3, Cooperative


Respiratory:  Clear to Auscultation


Cardiovascular:  Regular Rate





Hospital Course


Was the Problem List Reviewed?:  Yes


Uneventful course after she was admitted for confusion and found to have acute 

on chronic UTI placed on Empiric abx and ESBL noted so changed to Meropenem and 

transitioned to FQ at [place]. Confusion resolved and back to baseline.


Labs (last 24 hrs)


Laboratory Tests


3/14/23 18:24: 


Influenza Type A (RT-PCR) Not Detected, Influenza Type B (RT-PCR) Not Detected, 

SARS-CoV-2 RNA (RT-PCR) Not Detected


3/14/23 18:30: 


White Blood Count 8.7, Red Blood Count 4.01, Hemoglobin 12.8, Hematocrit 39, 

Mean Corpuscular Volume 98, Mean Corpuscular Hemoglobin 32, Mean Corpuscular 

Hemoglobin Concent 33, Red Cell Distribution Width 12.5, Platelet Count 211, 

Mean Platelet Volume 9.7, Immature Granulocyte % (Auto) 1, Neutrophils (%) 

(Auto) 82H, Lymphocytes (%) (Auto) 7L, Monocytes (%) (Auto) 10, Eosinophils (%) 

(Auto) 0, Basophils (%) (Auto) 0, Neutrophils # (Auto) 7.1, Lymphocytes # (Auto)

0.6L, Monocytes # (Auto) 0.9, Eosinophils # (Auto) 0.0, Basophils # (Auto) 0.0, 

Immature Granulocyte # (Auto) 0.1, Neutrophils % (Manual) 79, Lymphocytes % 

(Manual) 11, Monocytes % (Manual) 10, Blood Morphology Comment NORMAL, 

Erythrocyte Sedimentation Rate 58H, Prothrombin Time 13.7, INR Comment 1.0, 

Activated Partial Thromboplast Time 31, Sodium Level 137, Potassium Level 3.5L, 

Chloride Level 99, Carbon Dioxide Level 26, Anion Gap 12, Blood Urea Nitrogen 

15, Creatinine 0.80, Estimat Glomerular Filtration Rate 74, BUN/Creatinine Ratio

19, Glucose Level 205H, Lactic Acid Level 2.89*H, Calcium Level 9.3, Corrected 

Calcium 9.8, Magnesium Level 1.8, Total Bilirubin 0.3, Aspartate Amino Transf 

(AST/SGOT) 8, Alanine Aminotransferase (ALT/SGPT) 7, Alkaline Phosphatase 59, C-

Reactive Protein High Sensitivity 4.42H, Total Protein 7.3, Albumin 3.4, Amylase

Level 30, Lipase 27, Beta-Hydroxybutyrate (Chem panel) 0.08, TSH Cascade Testing

4.39, Valproic Acid (Depakene) Level 35.4L


3/14/23 19:01: 


Urine Color YELLOW, Urine Clarity SL CLOUDY, Urine pH 7.0, Urine Specific 

Gravity 1.010L, Urine Protein 1+H, Urine Glucose (UA) NEGATIVE, Urine Ketones 

NEGATIVE, Urine Nitrite POSITIVEH, Urine Bilirubin NEGATIVE, Urine Urobilinogen 

1.0, Urine Leukocyte Esterase 2+H, Urine RBC (Auto) 1+H, Urine RBC 5-10H, Urine 

WBC TNTCH, Urine Squamous Epithelial Cells 0-2, Urine Crystals NONE, Urine 

Bacteria LARGEH, Urine Casts NONE, Urine Mucus NEGATIVE, Urine Culture Indicated

CULTURE PENDING


3/14/23 20:37: Lactic Acid Level 1.32


3/15/23 06:10: 


White Blood Count 7.5, Red Blood Count 3.68L, Hemoglobin 11.6, Hematocrit 37, 

Mean Corpuscular Volume 100H, Mean Corpuscular Hemoglobin 32, Mean Corpuscular 

Hemoglobin Concent 32, Red Cell Distribution Width 12.6, Platelet Count 164, 

Mean Platelet Volume 9.5, Immature Granulocyte % (Auto) 1, Neutrophils (%) 

(Auto) 78H, Lymphocytes (%) (Auto) 8L, Monocytes (%) (Auto) 12, Eosinophils (%) 

(Auto) 0, Basophils (%) (Auto) 0, Neutrophils # (Auto) 5.8, Lymphocytes # (Auto)

0.6L, Monocytes # (Auto) 0.9, Eosinophils # (Auto) 0.0, Basophils # (Auto) 0.0, 

Immature Granulocyte # (Auto) 0.1, Sodium Level 140, Potassium Level 4.0, 

Chloride Level 104, Carbon Dioxide Level 26, Anion Gap 10, Blood Urea Nitrogen 

13, Creatinine 0.70, Estimat Glomerular Filtration Rate 87, BUN/Creatinine Ratio

19, Glucose Level 93, Mean Blood Glucose 103, Hemoglobin A1c 5.2, Calcium Level 

8.8, Corrected Calcium 9.7, Total Bilirubin 0.2, Aspartate Amino Transf 

(AST/SGOT) 9, Alanine Aminotransferase (ALT/SGPT) 7, Alkaline Phosphatase 48, 

Total Protein 6.5, Albumin 2.9L


3/15/23 06:17: Glucometer 85


3/15/23 07:28: Glucometer 90


3/15/23 10:32: Glucometer 99


3/15/23 15:19: Glucometer 105


3/15/23 20:50: Glucometer 123H


3/16/23 05:20: Glucometer 104


3/16/23 05:41: 


White Blood Count 6.9, Red Blood Count 3.51L, Hemoglobin 11.3L, Hematocrit 35, 

Mean Corpuscular Volume 99, Mean Corpuscular Hemoglobin 32, Mean Corpuscular 

Hemoglobin Concent 33, Red Cell Distribution Width 12.5, Platelet Count 165, 

Mean Platelet Volume 9.8, Immature Granulocyte % (Auto) 2, Neutrophils (%) 

(Auto) 67, Lymphocytes (%) (Auto) 17, Monocytes (%) (Auto) 13H, Eosinophils (%) 

(Auto) 1, Basophils (%) (Auto) 0, Neutrophils # (Auto) 4.7, Lymphocytes # (Auto)

1.1, Monocytes # (Auto) 0.9, Eosinophils # (Auto) 0.0, Basophils # (Auto) 0.0, 

Immature Granulocyte # (Auto) 0.1, Sodium Level 137, Potassium Level 4.1, 

Chloride Level 101, Carbon Dioxide Level 27, Anion Gap 9, Blood Urea Nitrogen 

14, Creatinine 0.70, Estimat Glomerular Filtration Rate 87, BUN/Creatinine Ratio

20, Glucose Level 100, Calcium Level 8.7, Corrected Calcium 9.7, Total Bilirubin

0.3, Aspartate Amino Transf (AST/SGOT) 8, Alanine Aminotransferase (ALT/SGPT) 7,

Alkaline Phosphatase 46, Total Protein 6.4, Albumin 2.8L


3/16/23 07:28: Glucometer 89


3/16/23 10:23: Glucometer 103


3/16/23 16:06: Glucometer 102


3/16/23 20:12: Glucometer 142H


3/17/23 05:35: 


White Blood Count 6.1, Red Blood Count 3.60L, Hemoglobin 11.5, Hematocrit 36, 

Mean Corpuscular Volume 99, Mean Corpuscular Hemoglobin 32, Mean Corpuscular 

Hemoglobin Concent 32, Red Cell Distribution Width 12.7, Platelet Count 165, 

Mean Platelet Volume 9.5, Immature Granulocyte % (Auto) 3, Neutrophils (%) 

(Auto) 59, Lymphocytes (%) (Auto) 18, Monocytes (%) (Auto) 17H, Eosinophils (%) 

(Auto) 3, Basophils (%) (Auto) 1, Neutrophils # (Auto) 3.6, Lymphocytes # (Auto)

1.1, Monocytes # (Auto) 1.0, Eosinophils # (Auto) 0.2, Basophils # (Auto) 0.1, 

Immature Granulocyte # (Auto) 0.2H, Sodium Level 140, Potassium Level 4.1, 

Chloride Level 103, Carbon Dioxide Level 30, Anion Gap 7, Blood Urea Nitrogen 

16, Creatinine 0.62, Estimat Glomerular Filtration Rate 89, BUN/Creatinine Ratio

26, Glucose Level 87, Calcium Level 8.9, Corrected Calcium 9.9, Total Bilirubin 

0.3, Aspartate Amino Transf (AST/SGOT) 13, Alanine Aminotransferase (ALT/SGPT) 

6, Alkaline Phosphatase 49, Total Protein 6.3L, Albumin 2.7L


3/17/23 05:57: Glucometer 81


3/17/23 12:59: Glucometer 145H


3/17/23 15:32: Glucometer 95





Microbiology


3/14/23 Urine Culture - Final, Complete


          Escherichia coli


          See Comments


3/14/23 Blood Culture - Preliminary, Resulted


          No growth





Pending Labs





Microbiology








 Date/Time


Source Procedure


Growth Status





 


 3/14/23 19:01


Urine Sotelo Cath Urine Culture - Final


Escherichia coli


See Comments Complete


 


 3/14/23 18:44


Peripheral Right Wrist Blood Culture - Preliminary


No growth Resulted


 


 3/14/23 18:30


Peripheral Rt Forearm Blood Culture - Preliminary


No growth Resulted





Laboratory Tests


3/14/23 18:24: 


Influenza Type A (RT-PCR) Not Detected, Influenza Type B (RT-PCR) Not Detected, 

SARS-CoV-2 RNA (RT-PCR) Not Detected


3/14/23 18:30: 


White Blood Count 8.7, Red Blood Count 4.01, Hemoglobin 12.8, Hematocrit 39, 

Mean Corpuscular Volume 98, Mean Corpuscular Hemoglobin 32, Mean Corpuscular 

Hemoglobin Concent 33, Red Cell Distribution Width 12.5, Platelet Count 211, 

Mean Platelet Volume 9.7, Immature Granulocyte % (Auto) 1, Neutrophils (%) 

(Auto) 82, Lymphocytes (%) (Auto) 7, Monocytes (%) (Auto) 10, Eosinophils (%) 

(Auto) 0, Basophils (%) (Auto) 0, Neutrophils # (Auto) 7.1, Lymphocytes # (Auto)

0.6, Monocytes # (Auto) 0.9, Eosinophils # (Auto) 0.0, Basophils # (Auto) 0.0, 

Immature Granulocyte # (Auto) 0.1, Neutrophils % (Manual) 79, Lymphocytes % (Man

ual) 11, Monocytes % (Manual) 10, Blood Morphology Comment NORMAL, Erythrocyte 

Sedimentation Rate 58, Prothrombin Time 13.7, INR Comment 1.0, Activated Partial

Thromboplast Time 31, Sodium Level 137, Potassium Level 3.5, Chloride Level 99, 

Carbon Dioxide Level 26, Anion Gap 12, Blood Urea Nitrogen 15, Creatinine 0.80, 

Estimat Glomerular Filtration Rate 74, BUN/Creatinine Ratio 19, Glucose Level 

205, Lactic Acid Level 2.89, Calcium Level 9.3, Corrected Calcium 9.8, Magnesium

Level 1.8, Total Bilirubin 0.3, Aspartate Amino Transf (AST/SGOT) 8, Alanine 

Aminotransferase (ALT/SGPT) 7, Alkaline Phosphatase 59, C-Reactive Protein High 

Sensitivity 4.42, Total Protein 7.3, Albumin 3.4, Amylase Level 30, Lipase 27, 

Beta-Hydroxybutyrate (Chem panel) 0.08, TSH Cascade Testing 4.39, Valproic Acid 

(Depakene) Level 35.4


3/14/23 19:01: 


Urine Color YELLOW, Urine Clarity SL CLOUDY, Urine pH 7.0, Urine Specific 

Gravity 1.010, Urine Protein 1+, Urine Glucose (UA) NEGATIVE, Urine Ketones 

NEGATIVE, Urine Nitrite POSITIVE, Urine Bilirubin NEGATIVE, Urine Urobilinogen 

1.0, Urine Leukocyte Esterase 2+, Urine RBC (Auto) 1+, Urine RBC 5-10, Urine WBC

TNTC, Urine Squamous Epithelial Cells 0-2, Urine Crystals NONE, Urine Bacteria 

LARGE, Urine Casts NONE, Urine Mucus NEGATIVE, Urine Culture Indicated CULTURE 

PENDING


3/14/23 20:37: Lactic Acid Level 1.32


3/15/23 06:10: 


White Blood Count 7.5, Red Blood Count 3.68, Hemoglobin 11.6, Hematocrit 37, 

Mean Corpuscular Volume 100, Mean Corpuscular Hemoglobin 32, Mean Corpuscular 

Hemoglobin Concent 32, Red Cell Distribution Width 12.6, Platelet Count 164, 

Mean Platelet Volume 9.5, Immature Granulocyte % (Auto) 1, Neutrophils (%) 

(Auto) 78, Lymphocytes (%) (Auto) 8, Monocytes (%) (Auto) 12, Eosinophils (%) 

(Auto) 0, Basophils (%) (Auto) 0, Neutrophils # (Auto) 5.8, Lymphocytes # (Auto)

0.6, Monocytes # (Auto) 0.9, Eosinophils # (Auto) 0.0, Basophils # (Auto) 0.0, 

Immature Granulocyte # (Auto) 0.1, Sodium Level 140, Potassium Level 4.0, Chl

oride Level 104, Carbon Dioxide Level 26, Anion Gap 10, Blood Urea Nitrogen 13, 

Creatinine 0.70, Estimat Glomerular Filtration Rate 87, BUN/Creatinine Ratio 19,

Glucose Level 93, Mean Blood Glucose 103, Hemoglobin A1c 5.2, Calcium Level 8.8,

Corrected Calcium 9.7, Total Bilirubin 0.2, Aspartate Amino Transf (AST/SGOT) 9,

Alanine Aminotransferase (ALT/SGPT) 7, Alkaline Phosphatase 48, Total Protein 

6.5, Albumin 2.9


3/15/23 06:17: Glucometer 85


3/15/23 07:28: Glucometer 90


3/15/23 10:32: Glucometer 99


3/15/23 15:19: Glucometer 105


3/15/23 20:50: Glucometer 123


3/16/23 05:20: Glucometer 104


3/16/23 05:41: 


White Blood Count 6.9, Red Blood Count 3.51, Hemoglobin 11.3, Hematocrit 35, 

Mean Corpuscular Volume 99, Mean Corpuscular Hemoglobin 32, Mean Corpuscular 

Hemoglobin Concent 33, Red Cell Distribution Width 12.5, Platelet Count 165, 

Mean Platelet Volume 9.8, Immature Granulocyte % (Auto) 2, Neutrophils (%) 

(Auto) 67, Lymphocytes (%) (Auto) 17, Monocytes (%) (Auto) 13, Eosinophils (%) 

(Auto) 1, Basophils (%) (Auto) 0, Neutrophils # (Auto) 4.7, Lymphocytes # (Auto)

1.1, Monocytes # (Auto) 0.9, Eosinophils # (Auto) 0.0, Basophils # (Auto) 0.0, 

Immature Granulocyte # (Auto) 0.1, Sodium Level 137, Potassium Level 4.1, 

Chloride Level 101, Carbon Dioxide Level 27, Anion Gap 9, Blood Urea Nitrogen 

14, Creatinine 0.70, Estimat Glomerular Filtration Rate 87, BUN/Creatinine Ratio

20, Glucose Level 100, Calcium Level 8.7, Corrected Calcium 9.7, Total Bilirubin

0.3, Aspartate Amino Transf (AST/SGOT) 8, Alanine Aminotransferase (ALT/SGPT) 7,

Alkaline Phosphatase 46, Total Protein 6.4, Albumin 2.8


3/16/23 07:28: Glucometer 89


3/16/23 10:23: Glucometer 103


3/16/23 16:06: Glucometer 102


3/16/23 20:12: Glucometer 142


3/17/23 05:35: 


White Blood Count 6.1, Red Blood Count 3.60, Hemoglobin 11.5, Hematocrit 36, 

Mean Corpuscular Volume 99, Mean Corpuscular Hemoglobin 32, Mean Corpuscular 

Hemoglobin Concent 32, Red Cell Distribution Width 12.7, Platelet Count 165, 

Mean Platelet Volume 9.5, Immature Granulocyte % (Auto) 3, Neutrophils (%) (Aut

o) 59, Lymphocytes (%) (Auto) 18, Monocytes (%) (Auto) 17, Eosinophils (%) 

(Auto) 3, Basophils (%) (Auto) 1, Neutrophils # (Auto) 3.6, Lymphocytes # (Auto)

1.1, Monocytes # (Auto) 1.0, Eosinophils # (Auto) 0.2, Basophils # (Auto) 0.1, 

Immature Granulocyte # (Auto) 0.2, Sodium Level 140, Potassium Level 4.1, 

Chloride Level 103, Carbon Dioxide Level 30, Anion Gap 7, Blood Urea Nitrogen 

16, Creatinine 0.62, Estimat Glomerular Filtration Rate 89, BUN/Creatinine Ratio

26, Glucose Level 87, Calcium Level 8.9, Corrected Calcium 9.9, Total Bilirubin 

0.3, Aspartate Amino Transf (AST/SGOT) 13, Alanine Aminotransferase (ALT/SGPT) 

6, Alkaline Phosphatase 49, Total Protein 6.3, Albumin 2.7


3/17/23 05:57: Glucometer 81


3/17/23 12:59: Glucometer 145


3/17/23 15:32: Glucometer 95








Discharge


Home Medications:





Active Scripts


Active


Levofloxacin 500 Mg Tablet 500 Mg PO DAILY


Reported


Nicotine Patch (Nicotine) 21 Mg/24 Hour Patch.td24 21 Mg TD DAILY PRN


Milk of Magnesia (Magnesium Hydroxide) 400 Mg/5 Ml Oral.susp 30 Ml PO DAILY PRN


[place] Saline (Sodium Chloride) 0.65 % Spray 1 Spray NSEACH UD PRN


Tylenol Arthritis (Acetaminophen) 650 Mg Tablet.er 650 Mg PO Q4H PRN


Lubricant Eye Drops (Carboxymethylcellulose Sodium) 0.5 % Drops 1 Drop OU QID


Haloperidol Lactate 2 Mg/Ml Oral.conc 0.5 Ml PO BID


     MAY PUT IN 6OZ OF ANY PREFERRED BEVERAGE


Flonase Allergy Relief (Fluticasone Propionate) 50 Mcg/Actuation Spray.susp 1 

Spray NSEACH BID


Vitamin B Complex 1 Each Tablet 1 Each PO DAILY


Probiotic Digest Supp 20B Cfu (L. Rhamnosus GG/Inulin) 20 Billion Cell-200 Mg 

Capsule 1 Each PO DAILY


Miralax (Polyethylene Glycol 3350) 17 Gram Powd.pack 17 Gm PO DAILY


Omeprazole 40 Mg Capsule.dr 40 Mg PO DAILY


One Daily Plus Minerals (Multivitamin with Minerals) 1 Each Tablet 1 Each PO 

DAILY


Metoprolol Succinate 25 Mg Tab.er.24h 25 Mg PO DAILY


     HOLD FOR PULSE <60 NOTIFY NURSE IF HELD


Lutein 20 Mg Tablet 20 Mg PO DAILY


Xalatan (Latanoprost) 0.005 % Drops 1 Drop OU HS


Klor-Con 10 (Potassium Chloride) 10 Meq Tablet.er 10 Meq PO DAILY


Furosemide 40 Mg Tablet 40 Mg PO DAILY


Ferosul (Ferrous Sulfate) 325 Mg (65 Mg Iron) Tablet 325 Mg PO DAILY


Levothyroxine Sodium 112 Mcg Tablet 112 Mcg PO DAILY


Divalproex Sodium 500 Mg Tablet.dr 1,500 Mg PO HS


     TAKES 3 (500MG) TABS


Vitamin D3 (Cholecalciferol (Vitamin D3)) 50 Mcg (2000 Unit) Capsule 50 Mcg PO 

DAILY


Losartan Potassium 100 Mg Tablet 100 Mg PO DAILY


     HOLD FOR SBP <100 OR PULSE <60- NOTIFY NURSE IF MEDICATION IS HELD


     NOTIFY PCP IF HELD FOR 3 CONSECUTIVE DAYS


Calcium 600 + Vit D 200 Tablet (Calcium Carbonate/Vitamin D3) 600 Mg Calcium-5 

Mcg (200 Unit) Tablet 1 Each PO DAILY


Atorvastatin Calcium 40 Mg Tablet 40 Mg PO DAILY


Aspirin EC (Aspirin) 81 Mg Tablet.dr 81 Mg PO DAILY


Alendronate Sodium 70 Mg Tablet 70 Mg PO FRI





Instructions to patient/family


Please see electronic discharge instructions given to patient.





Diagnosis/Problems


Diagnosis/Problems





(1) Sepsis


(2) Altered mental status


Status:  Acute


(3) UTI (urinary tract infection)


Status:  Acute











BRITTANY LINDSEY DO                Mar 17, 2023 11:35

## 2023-03-17 NOTE — DISCHARGE INST-SKILLED NURSING
Discharge Inst-Skilled NF


Reconcile Patient Problems


Problems Reviewed?:  Yes





Patient Instructions


Patient Problems:  


Debility


UTI


Goal:  


[place]





Consult/Follow Up/Orders


Follow Up Appt.:  


DR Machuca/Veena Barboza within 2 weeks


Skilled NF Admit to:  [facility], operated by [facility]


[facility] ([facility])


I certify that SNF services are required to be given on an inpatient basis 

because of the above named patient's need for skilled nursing care on a 

continuing basis for the conditions(s) for which he/she was receiving inpatient 

hospital services prior to his/her transfer to the [facility].


Skilled Nursing Facility Order:  Nursing Services, Occupational Ther-Evaluate & 

Treat, Physical Therapy-Evaluate & Treat


Oxygen Delivery Method:  Nasal Cannula


Discharge Diet:  No Restrictions


Resuscitation Status:  Full Code





New & Resume Previous Orders


New Medications:  


Levofloxacin (Levofloxacin) 500 Mg Tablet


500 MG PO DAILY, #7 TAB





 


Continued Medications:  


Acetaminophen (Tylenol Arthritis) 650 Mg Tablet.er


650 MG PO Q4H PRN for PAIN-MILD (1-4), TAB





Alendronate Sodium (Alendronate Sodium) 70 Mg Tablet


70 MG PO FRI, TAB





Aspirin (Aspirin EC) 81 Mg Tablet.dr


81 MG PO DAILY, TAB





Atorvastatin Calcium (Atorvastatin Calcium) 40 Mg Tablet


40 MG PO DAILY, TAB





Calcium Carbonate/Vitamin D3 (Calcium 600 + Vit D 200 Tablet) 600 Mg Calcium-5 

Mcg (200 Unit) Tablet


1 EACH PO DAILY, TAB





Carboxymethylcellulose Sodium (Lubricant Eye Drops) 0.5 % Drops


1 DROP OU QID, DROPS





Cholecalciferol (Vitamin D3) (Vitamin D3) 50 Mcg (2000 Unit) Capsule


50 MCG PO DAILY, CAP





Divalproex Sodium (Divalproex Sodium) 500 Mg Tablet.dr


1500 MG PO HS, TAB


TAKES 3 (500MG) TABS


Ferrous Sulfate (Ferosul) 325 Mg (65 Mg Iron) Tablet


325 MG PO DAILY, TAB





Fluticasone Propionate (Flonase Allergy Relief) 50 Mcg/Actuation Spray.susp


1 SPRAY NSEACH BID, EACH





Furosemide (Furosemide) 40 Mg Tablet


40 MG PO DAILY, TAB





Haloperidol Lactate (Haloperidol Lactate) 2 Mg/Ml Oral.conc


0.5 ML PO BID, ML


MAY PUT IN 6OZ OF ANY PREFERRED BEVERAGE


L. Rhamnosus GG/Inulin (Probiotic Digest Supp 20B Cfu) 20 Billion Cell-200 Mg 

Capsule


1 EACH PO DAILY, CAP





Latanoprost (Xalatan) 0.005 % Drops


1 DROP OU HS, EA





Levothyroxine Sodium (Levothyroxine Sodium) 112 Mcg Tablet


112 MCG PO DAILY, TAB





Losartan Potassium (Losartan Potassium) 100 Mg Tablet


100 MG PO DAILY, TAB


HOLD FOR SBP <100 OR PULSE <60- NOTIFY NURSE IF MEDICATION IS HELD


 NOTIFY PCP IF HELD FOR 3 CONSECUTIVE DAYS


Lutein (Lutein) 20 Mg Tablet


20 MG PO DAILY, TAB





Magnesium Hydroxide (Milk of Magnesia) 400 Mg/5 Ml Oral.susp


30 ML PO DAILY PRN for CONSTIPATION-7TH LINE, ML





Metoprolol Succinate (Metoprolol Succinate) 25 Mg Tab.er.24h


25 MG PO DAILY, TAB


HOLD FOR PULSE <60 NOTIFY NURSE IF HELD


Multivitamin with Minerals (One Daily Plus Minerals) 1 Each Tablet


1 EACH PO DAILY, TAB





Nicotine (Nicotine Patch) 21 Mg/24 Hour Patch.td24


21 MG TD DAILY PRN for SMOKING CESSATION, PATCH





Omeprazole (Omeprazole) 40 Mg Capsule.dr


40 MG PO DAILY, CAP





Polyethylene Glycol 3350 (Miralax) 17 Gram Powd.pack


17 GM PO DAILY, EACH





Potassium Chloride (Klor-Con 10) 10 Meq Tablet.er


10 MEQ PO DAILY, TAB





Sodium Chloride ([place]yr Saline) 0.65 % [place]


1 SPRAY NSEACH UD PRN for DRY NOSE, EA





Vitamin B Complex (Vitamin B Complex) 1 Each Tablet


1 EACH PO DAILY, TAB








Shahida Machuca 


Mar 17, 2023 


11:34











SHAHIDA MACHUCA DO                Mar 17, 2023 11:35

## 2023-03-17 NOTE — PHYSICAL THERAPY DAILY NOTE
PT Daily Note-Current


Subjective


Patient agrees to exercises.





Pain


Section J - Health Conditions


1. Rarely or not at all


2. Occasionally


3. Frequently


4. Almost constantly


8. Unable to answer


Pain Effect on Sleep:  1


Pain Interference with Therapy:  1


Pain Interference w/Day-to-Day:  1





Transfers


SCALE: Activities may be completed with or without assistive devices.





6-Indepedent-patient completes the activity by him/herself with no assistance 

from a helper.


5-Set-up or Clean-up Assistance-helper sets up or cleans up; patient completes 

activity. Mora assists only prior to or  


    following the activity.


4-Supervision or Touching Assistance-helper provides verbal cues and/or 

touching/steadying and/or contact guard assistance as patient completes 

activity. Assistance may be provided   


    throughout the activity or intermittently.


3-Partial/Moderate Assistance-helper does LESS THAN HALF the effort. Mora 

lifts, holds or supports trunk or limbs, but provides less than half the effort.


2-Substantial/Maximal Assistance-helper does MORE THAN HALF the effort. Mora 

lifts or holds trunk or limbs and provides more than half the effort.


5-Uuhtjfmwi-wrxkgn does ALL the effort. Patient does none of the effort to 

complete the activity. Or, the assistance of 2 or more helpers is required for 

the patient to complete the  


    activity.


If activity was not attempted, code reason:


7-Patient Refused.


9-Not Applicable-not attempted and the patient did not perform the activity 

before the current illness, exacerbation or injury.


10-Not Attempted due to Environmental Limitations-(lack of equipment, weather 

restraints, etc.).


88-Not Attempted due to Medical Conditions or Safety Concerns.





Weight Bearing


Right Lower Extremity:  Right


Full Weight Bearing


Left Lower Extremity:  Left


Full Weight Bearing





Exercises


Supine Ex:  Ankle pumps, Quad Set, Heel Slides


Supine Reps:  15 (in recliner with bilateral LE elevated)


Seated Therapy Exercises:  Ankle pumps, Long arc quads


Seated Reps:  15





Assessment


Patient declined ambulation due to had just received a shower.  PT to continue 

to increase activity as tolerated by patient.





PT Long Term Goals


Long Term Goals


PT Long Term Goals Time Frame:  Mar 31, 2023


Roll Left & Right (QC):  6


Sit to Lying (QC):  6


Lying-Sitting on Side/Bed(QC):  6


Sit to Stand (QC):  6


Chair/Bed-to-Chair Xfer(QC):  6


Toilet Transfer (QC):  6


Does the Patient Walk:  Yes


Walk 10 feet (QC):  6


Walk 50ft with 2 Turns (QC):  6


Walk 150 ft (QC):  6





PT Plan


Treatment/Plan


Treatment Plan:  Continue Plan of Care


Treatment Plan:  Bed Mobility, Education, Functional Activity Jared, Functional 

Strength, Group Therapy, Gait, Safety, Therapeutic Exercise, Transfers


Treatment Duration:  Mar 31, 2023


Frequency:  6 times per week


Estimated Hrs Per Day:  .25 hour per day


Patient and/or Family Agrees t:  Yes





Time


Time In:  1011


Time Out:  1019


DATE:  Mar 17, 2023


Total Billed Treatment Time:  8


Total Billed Treatment


1 visit


EX 8 min











JYOTHI PRETTY PT              Mar 17, 2023 11:00